# Patient Record
Sex: FEMALE | Race: WHITE | NOT HISPANIC OR LATINO | Employment: FULL TIME | URBAN - METROPOLITAN AREA
[De-identification: names, ages, dates, MRNs, and addresses within clinical notes are randomized per-mention and may not be internally consistent; named-entity substitution may affect disease eponyms.]

---

## 2017-03-09 DIAGNOSIS — Z12.31 ENCOUNTER FOR SCREENING MAMMOGRAM FOR MALIGNANT NEOPLASM OF BREAST: ICD-10-CM

## 2017-04-03 ENCOUNTER — ALLSCRIPTS OFFICE VISIT (OUTPATIENT)
Dept: OTHER | Facility: OTHER | Age: 57
End: 2017-04-03

## 2017-09-07 ENCOUNTER — GENERIC CONVERSION - ENCOUNTER (OUTPATIENT)
Dept: OTHER | Facility: OTHER | Age: 57
End: 2017-09-07

## 2017-10-05 ENCOUNTER — APPOINTMENT (OUTPATIENT)
Dept: LAB | Facility: CLINIC | Age: 57
End: 2017-10-05
Payer: COMMERCIAL

## 2017-10-05 DIAGNOSIS — I10 ESSENTIAL (PRIMARY) HYPERTENSION: ICD-10-CM

## 2017-10-05 LAB
ALBUMIN SERPL BCP-MCNC: 3.7 G/DL (ref 3.5–5)
ALP SERPL-CCNC: 63 U/L (ref 46–116)
ALT SERPL W P-5'-P-CCNC: 34 U/L (ref 12–78)
ANION GAP SERPL CALCULATED.3IONS-SCNC: 7 MMOL/L (ref 4–13)
AST SERPL W P-5'-P-CCNC: 26 U/L (ref 5–45)
BASOPHILS # BLD AUTO: 0.03 THOUSANDS/ΜL (ref 0–0.1)
BASOPHILS NFR BLD AUTO: 0 % (ref 0–1)
BILIRUB SERPL-MCNC: 0.3 MG/DL (ref 0.2–1)
BUN SERPL-MCNC: 13 MG/DL (ref 5–25)
CALCIUM SERPL-MCNC: 9.4 MG/DL (ref 8.3–10.1)
CHLORIDE SERPL-SCNC: 106 MMOL/L (ref 100–108)
CHOLEST SERPL-MCNC: 265 MG/DL (ref 50–200)
CO2 SERPL-SCNC: 27 MMOL/L (ref 21–32)
CREAT SERPL-MCNC: 0.72 MG/DL (ref 0.6–1.3)
EOSINOPHIL # BLD AUTO: 0.21 THOUSAND/ΜL (ref 0–0.61)
EOSINOPHIL NFR BLD AUTO: 3 % (ref 0–6)
ERYTHROCYTE [DISTWIDTH] IN BLOOD BY AUTOMATED COUNT: 12.5 % (ref 11.6–15.1)
GFR SERPL CREATININE-BSD FRML MDRD: 92 ML/MIN/1.73SQ M
GLUCOSE SERPL-MCNC: 103 MG/DL (ref 65–140)
HCT VFR BLD AUTO: 40 % (ref 34.8–46.1)
HDLC SERPL-MCNC: 70 MG/DL (ref 40–60)
HGB BLD-MCNC: 13.2 G/DL (ref 11.5–15.4)
LDLC SERPL CALC-MCNC: 158 MG/DL (ref 0–100)
LYMPHOCYTES # BLD AUTO: 2.93 THOUSANDS/ΜL (ref 0.6–4.47)
LYMPHOCYTES NFR BLD AUTO: 39 % (ref 14–44)
MCH RBC QN AUTO: 32 PG (ref 26.8–34.3)
MCHC RBC AUTO-ENTMCNC: 33 G/DL (ref 31.4–37.4)
MCV RBC AUTO: 97 FL (ref 82–98)
MONOCYTES # BLD AUTO: 0.5 THOUSAND/ΜL (ref 0.17–1.22)
MONOCYTES NFR BLD AUTO: 7 % (ref 4–12)
NEUTROPHILS # BLD AUTO: 3.9 THOUSANDS/ΜL (ref 1.85–7.62)
NEUTS SEG NFR BLD AUTO: 51 % (ref 43–75)
NRBC BLD AUTO-RTO: 0 /100 WBCS
PLATELET # BLD AUTO: 313 THOUSANDS/UL (ref 149–390)
PMV BLD AUTO: 10.8 FL (ref 8.9–12.7)
POTASSIUM SERPL-SCNC: 4 MMOL/L (ref 3.5–5.3)
PROT SERPL-MCNC: 7.2 G/DL (ref 6.4–8.2)
RBC # BLD AUTO: 4.13 MILLION/UL (ref 3.81–5.12)
SODIUM SERPL-SCNC: 140 MMOL/L (ref 136–145)
TRIGL SERPL-MCNC: 184 MG/DL
TSH SERPL DL<=0.05 MIU/L-ACNC: 0.92 UIU/ML (ref 0.36–3.74)
WBC # BLD AUTO: 7.58 THOUSAND/UL (ref 4.31–10.16)

## 2017-10-05 PROCEDURE — 80053 COMPREHEN METABOLIC PANEL: CPT

## 2017-10-05 PROCEDURE — 85025 COMPLETE CBC W/AUTO DIFF WBC: CPT

## 2017-10-05 PROCEDURE — 80061 LIPID PANEL: CPT

## 2017-10-05 PROCEDURE — 84443 ASSAY THYROID STIM HORMONE: CPT

## 2017-10-05 PROCEDURE — 36415 COLL VENOUS BLD VENIPUNCTURE: CPT

## 2017-10-10 ENCOUNTER — GENERIC CONVERSION - ENCOUNTER (OUTPATIENT)
Dept: OTHER | Facility: OTHER | Age: 57
End: 2017-10-10

## 2017-10-10 DIAGNOSIS — Z12.31 ENCOUNTER FOR SCREENING MAMMOGRAM FOR MALIGNANT NEOPLASM OF BREAST: ICD-10-CM

## 2018-01-13 VITALS
DIASTOLIC BLOOD PRESSURE: 78 MMHG | WEIGHT: 175 LBS | SYSTOLIC BLOOD PRESSURE: 128 MMHG | BODY MASS INDEX: 29.16 KG/M2 | HEIGHT: 65 IN

## 2018-01-22 VITALS
HEIGHT: 65 IN | BODY MASS INDEX: 28.55 KG/M2 | SYSTOLIC BLOOD PRESSURE: 112 MMHG | DIASTOLIC BLOOD PRESSURE: 86 MMHG | OXYGEN SATURATION: 97 % | WEIGHT: 171.38 LBS | HEART RATE: 73 BPM

## 2018-03-22 ENCOUNTER — TELEPHONE (OUTPATIENT)
Dept: INTERNAL MEDICINE CLINIC | Facility: CLINIC | Age: 58
End: 2018-03-22

## 2018-03-22 DIAGNOSIS — F41.9 ANXIETY: Primary | ICD-10-CM

## 2018-03-22 RX ORDER — LORAZEPAM 1 MG/1
TABLET ORAL
COMMUNITY
End: 2018-03-22 | Stop reason: SDUPTHER

## 2018-03-22 RX ORDER — LORAZEPAM 1 MG/1
1 TABLET ORAL DAILY
Qty: 90 TABLET | Refills: 3 | Status: SHIPPED | OUTPATIENT
Start: 2018-03-22 | End: 2018-11-08 | Stop reason: SDUPTHER

## 2018-04-16 DIAGNOSIS — I10 ESSENTIAL (PRIMARY) HYPERTENSION: ICD-10-CM

## 2018-04-16 DIAGNOSIS — R79.89 OTHER SPECIFIED ABNORMAL FINDINGS OF BLOOD CHEMISTRY: ICD-10-CM

## 2018-11-05 DIAGNOSIS — T78.40XS ALLERGIC STATE, SEQUELA: Primary | ICD-10-CM

## 2018-11-05 RX ORDER — FLUTICASONE PROPIONATE 50 MCG
SPRAY, SUSPENSION (ML) NASAL
Qty: 1 BOTTLE | Refills: 1 | Status: SHIPPED | OUTPATIENT
Start: 2018-11-05 | End: 2019-03-31 | Stop reason: SDUPTHER

## 2018-11-08 DIAGNOSIS — F41.9 ANXIETY: ICD-10-CM

## 2018-11-08 RX ORDER — LORAZEPAM 1 MG/1
TABLET ORAL
Qty: 90 TABLET | Refills: 1 | Status: SHIPPED | OUTPATIENT
Start: 2018-11-08 | End: 2019-07-10 | Stop reason: SDUPTHER

## 2018-11-12 ENCOUNTER — APPOINTMENT (OUTPATIENT)
Dept: LAB | Facility: CLINIC | Age: 58
End: 2018-11-12
Payer: COMMERCIAL

## 2018-11-12 DIAGNOSIS — I10 ESSENTIAL (PRIMARY) HYPERTENSION: ICD-10-CM

## 2018-11-12 DIAGNOSIS — R79.89 OTHER SPECIFIED ABNORMAL FINDINGS OF BLOOD CHEMISTRY: ICD-10-CM

## 2018-11-12 LAB
ALBUMIN SERPL BCP-MCNC: 3.8 G/DL (ref 3.5–5)
ALP SERPL-CCNC: 54 U/L (ref 46–116)
ALT SERPL W P-5'-P-CCNC: 43 U/L (ref 12–78)
ANION GAP SERPL CALCULATED.3IONS-SCNC: 5 MMOL/L (ref 4–13)
AST SERPL W P-5'-P-CCNC: 30 U/L (ref 5–45)
BILIRUB SERPL-MCNC: 0.23 MG/DL (ref 0.2–1)
BUN SERPL-MCNC: 16 MG/DL (ref 5–25)
CALCIUM SERPL-MCNC: 8.6 MG/DL (ref 8.3–10.1)
CHLORIDE SERPL-SCNC: 104 MMOL/L (ref 100–108)
CHOLEST SERPL-MCNC: 219 MG/DL (ref 50–200)
CO2 SERPL-SCNC: 27 MMOL/L (ref 21–32)
CREAT SERPL-MCNC: 0.72 MG/DL (ref 0.6–1.3)
ERYTHROCYTE [DISTWIDTH] IN BLOOD BY AUTOMATED COUNT: 12.5 % (ref 11.6–15.1)
EST. AVERAGE GLUCOSE BLD GHB EST-MCNC: 114 MG/DL
GFR SERPL CREATININE-BSD FRML MDRD: 91 ML/MIN/1.73SQ M
GLUCOSE SERPL-MCNC: 91 MG/DL (ref 65–140)
HBA1C MFR BLD: 5.6 % (ref 4.2–6.3)
HCT VFR BLD AUTO: 39.1 % (ref 34.8–46.1)
HDLC SERPL-MCNC: 66 MG/DL (ref 40–60)
HGB BLD-MCNC: 12.7 G/DL (ref 11.5–15.4)
LDLC SERPL CALC-MCNC: 126 MG/DL (ref 0–100)
MCH RBC QN AUTO: 32 PG (ref 26.8–34.3)
MCHC RBC AUTO-ENTMCNC: 32.5 G/DL (ref 31.4–37.4)
MCV RBC AUTO: 99 FL (ref 82–98)
NONHDLC SERPL-MCNC: 153 MG/DL
PLATELET # BLD AUTO: 236 THOUSANDS/UL (ref 149–390)
PMV BLD AUTO: 10.3 FL (ref 8.9–12.7)
POTASSIUM SERPL-SCNC: 4.2 MMOL/L (ref 3.5–5.3)
PROT SERPL-MCNC: 7.4 G/DL (ref 6.4–8.2)
RBC # BLD AUTO: 3.97 MILLION/UL (ref 3.81–5.12)
SODIUM SERPL-SCNC: 136 MMOL/L (ref 136–145)
TRIGL SERPL-MCNC: 133 MG/DL
TSH SERPL DL<=0.05 MIU/L-ACNC: 0.94 UIU/ML (ref 0.36–3.74)
WBC # BLD AUTO: 7.39 THOUSAND/UL (ref 4.31–10.16)

## 2018-11-12 PROCEDURE — 80061 LIPID PANEL: CPT

## 2018-11-12 PROCEDURE — 36415 COLL VENOUS BLD VENIPUNCTURE: CPT

## 2018-11-12 PROCEDURE — 85027 COMPLETE CBC AUTOMATED: CPT

## 2018-11-12 PROCEDURE — 80053 COMPREHEN METABOLIC PANEL: CPT

## 2018-11-12 PROCEDURE — 84443 ASSAY THYROID STIM HORMONE: CPT

## 2018-11-12 PROCEDURE — 83036 HEMOGLOBIN GLYCOSYLATED A1C: CPT

## 2018-11-13 ENCOUNTER — TRANSCRIBE ORDERS (OUTPATIENT)
Dept: MAMMOGRAPHY | Facility: CLINIC | Age: 58
End: 2018-11-13

## 2018-11-27 ENCOUNTER — OFFICE VISIT (OUTPATIENT)
Dept: INTERNAL MEDICINE CLINIC | Facility: CLINIC | Age: 58
End: 2018-11-27
Payer: COMMERCIAL

## 2018-11-27 ENCOUNTER — TELEPHONE (OUTPATIENT)
Dept: OBGYN CLINIC | Age: 58
End: 2018-11-27

## 2018-11-27 ENCOUNTER — TELEPHONE (OUTPATIENT)
Dept: INTERNAL MEDICINE CLINIC | Facility: CLINIC | Age: 58
End: 2018-11-27

## 2018-11-27 ENCOUNTER — HOSPITAL ENCOUNTER (OUTPATIENT)
Dept: MAMMOGRAPHY | Facility: CLINIC | Age: 58
Discharge: HOME | End: 2018-11-27
Payer: COMMERCIAL

## 2018-11-27 VITALS — HEIGHT: 66 IN | BODY MASS INDEX: 25.71 KG/M2 | WEIGHT: 160 LBS

## 2018-11-27 DIAGNOSIS — L98.7 EXCESSIVE SKIN AND SUBCUTANEOUS TISSUE: ICD-10-CM

## 2018-11-27 DIAGNOSIS — M25.512 LEFT SHOULDER PAIN, UNSPECIFIED CHRONICITY: ICD-10-CM

## 2018-11-27 DIAGNOSIS — M54.2 NECK PAIN: ICD-10-CM

## 2018-11-27 DIAGNOSIS — I10 ESSENTIAL HYPERTENSION: ICD-10-CM

## 2018-11-27 DIAGNOSIS — Z12.31 BREAST CANCER SCREENING BY MAMMOGRAM: Primary | ICD-10-CM

## 2018-11-27 DIAGNOSIS — D75.89 MACROCYTOSIS: ICD-10-CM

## 2018-11-27 DIAGNOSIS — Z12.11 SCREENING FOR COLON CANCER: Primary | ICD-10-CM

## 2018-11-27 DIAGNOSIS — E78.5 HYPERLIPIDEMIA, UNSPECIFIED HYPERLIPIDEMIA TYPE: ICD-10-CM

## 2018-11-27 DIAGNOSIS — Z78.0 POST-MENOPAUSAL: ICD-10-CM

## 2018-11-27 DIAGNOSIS — Z00.00 PHYSICAL EXAM: ICD-10-CM

## 2018-11-27 DIAGNOSIS — M25.50 ARTHRALGIA, UNSPECIFIED JOINT: ICD-10-CM

## 2018-11-27 DIAGNOSIS — R73.01 IMPAIRED FASTING BLOOD SUGAR: ICD-10-CM

## 2018-11-27 DIAGNOSIS — Z23 ENCOUNTER FOR IMMUNIZATION: Primary | ICD-10-CM

## 2018-11-27 DIAGNOSIS — Z12.31 BREAST CANCER SCREENING BY MAMMOGRAM: ICD-10-CM

## 2018-11-27 PROCEDURE — 90471 IMMUNIZATION ADMIN: CPT

## 2018-11-27 PROCEDURE — 77063 BREAST TOMOSYNTHESIS BI: CPT

## 2018-11-27 PROCEDURE — 99496 TRANSJ CARE MGMT HIGH F2F 7D: CPT | Performed by: NURSE PRACTITIONER

## 2018-11-27 PROCEDURE — 90686 IIV4 VACC NO PRSV 0.5 ML IM: CPT

## 2018-11-27 PROCEDURE — 77067 SCR MAMMO BI INCL CAD: CPT

## 2018-11-27 NOTE — TELEPHONE ENCOUNTER
Patient is calling she would like an order for her MAMMO for 2018  She would like to get it done today

## 2018-11-27 NOTE — PATIENT INSTRUCTIONS
58-year-old female who or peel peers in excellent condition  She is having mammogram today, she has never had bone density so this is been ordered  She has never had colonoscopy she would like to inquire about colo card she will let us know we will order if insurance will cover this  Continue diet exercise and weight loss efforts  Mild macrocytosis she does admit that she has 1-2 vodkas per night I recommend no more than 1 oz per evening    Recheck in 1 year  Situational anxiety she takes 1/2 lorazepam as needed  Neck pain/shoulder pain likely musculoskeletal she does get frequent massage his would also recommend physical therapy referral made  Hyperlipidemia markedly improvement with weight loss and dietary changes  Follow up 1 year sooner if needed  Undesirable tissue under arms refer to Plastic surgery  Foot pain she states that she was told she has psoriatic arthritis I do not have any documentation to support this she was not refer to her rheumatologist we will try to obtain her podiatrist last note

## 2018-11-27 NOTE — TELEPHONE ENCOUNTER
PT  SAW  CHRIS  TODAY   NOW  SHE  WANTS  HER  INFO  SENT  TO  [facility]  SHE  WILL  CALL THEM  ABOUT  HER  INSURANCE   THANKS

## 2018-11-27 NOTE — PROGRESS NOTES
Assessment/Plan:    Patient Instructions   77-year-old female who or peel peers in excellent condition  She is having mammogram today, she has never had bone density so this is been ordered  She has never had colonoscopy she would like to inquire about colo card she will let us know we will order if insurance will cover this  Continue diet exercise and weight loss efforts  Mild macrocytosis she does admit that she has 1-2 vodkas per night I recommend no more than 1 oz per evening  Recheck in 1 year  Situational anxiety she takes 1/2 lorazepam as needed  Neck pain/shoulder pain likely musculoskeletal she does get frequent massage his would also recommend physical therapy referral made  Hyperlipidemia markedly improvement with weight loss and dietary changes  Follow up 1 year sooner if needed  Undesirable tissue under arms refer to Plastic surgery  Foot pain she states that she was told she has psoriatic arthritis I do not have any documentation to support this she was not refer to her rheumatologist we will try to obtain her podiatrist last note         Diagnoses and all orders for this visit:    Encounter for immunization  -     SYRINGE/SINGLE-DOSE VIAL: influenza vaccine, [id number], quadrivalent, 0 5 mL, preservative-free, for patients 3+ yr (FLUZONE)    Physical exam    Neck pain  -     Ambulatory referral to Physical Therapy; Future    Left shoulder pain, unspecified chronicity  -     Ambulatory referral to Physical Therapy; Future    Post-menopausal  -     DXA bone density spine hip and pelvis; Future    Macrocytosis  -     CBC and differential; Future    Arthralgia, unspecified joint    Essential hypertension  -     Comprehensive metabolic panel; Future    Impaired fasting blood sugar  -     Hemoglobin A1C; Future    Excessive skin and subcutaneous tissue  -     Ambulatory referral to Plastic Surgery; Future    Hyperlipidemia, unspecified hyperlipidemia type  -     Lipid panel;  Future  -     TSH, 3rd generation with Free T4 reflex;  Future         Subjective:      Patient ID: Mati aPce is a 59 y o  female    Generally feeling well  She would like to inquire about colo card she has never had colonoscopy  She is getting mammogram today  Up-to-date with gynecology  Active  She had plastic surgery about 3 years ago for undesirable tissue under the arm was unsuccessful they actually gave her her money back but it still bothers her  She has been having neck pain and shoulder pain upper back pain for about 6 months worse with movement she has been getting massages it is minimally effective          Current Outpatient Prescriptions:     fluticasone (FLONASE) 50 mcg/act nasal spray, USE 2 SPRAYS IN EACH NOSTRIL ONCE DAILY, Disp: 1 Bottle, Rfl: 1    LORazepam (ATIVAN) 1 mg tablet, TAKE 1 TABLET BY MOUTH EVERY DAY, Disp: 90 tablet, Rfl: 1    Recent Results (from the past 1008 hour(s))   CBC    Collection Time: 11/12/18  5:03 PM   Result Value Ref Range    WBC 7 39 4 31 - 10 16 Thousand/uL    RBC 3 97 3 81 - 5 12 Million/uL    Hemoglobin 12 7 11 5 - 15 4 g/dL    Hematocrit 39 1 34 8 - 46 1 %    MCV 99 (H) 82 - 98 fL    MCH 32 0 26 8 - 34 3 pg    MCHC 32 5 31 4 - 37 4 g/dL    RDW 12 5 11 6 - 15 1 %    Platelets 281 197 - 166 Thousands/uL    MPV 10 3 8 9 - 12 7 fL   Comprehensive metabolic panel    Collection Time: 11/12/18  5:03 PM   Result Value Ref Range    Sodium 136 136 - 145 mmol/L    Potassium 4 2 3 5 - 5 3 mmol/L    Chloride 104 100 - 108 mmol/L    CO2 27 21 - 32 mmol/L    ANION GAP 5 4 - 13 mmol/L    BUN 16 5 - 25 mg/dL    Creatinine 0 72 0 60 - 1 30 mg/dL    Glucose 91 65 - 140 mg/dL    Calcium 8 6 8 3 - 10 1 mg/dL    AST 30 5 - 45 U/L    ALT 43 12 - 78 U/L    Alkaline Phosphatase 54 46 - 116 U/L    Total Protein 7 4 6 4 - 8 2 g/dL    Albumin 3 8 3 5 - 5 0 g/dL    Total Bilirubin 0 23 0 20 - 1 00 mg/dL    eGFR 91 ml/min/1 73sq m   Hemoglobin A1c    Collection Time: 11/12/18  5:03 PM   Result Value Ref Range Hemoglobin A1C 5 6 4 2 - 6 3 %     mg/dl   Lipid panel    Collection Time: 11/12/18  5:03 PM   Result Value Ref Range    Cholesterol 219 (H) 50 - 200 mg/dL    Triglycerides 133 <=150 mg/dL    HDL, Direct 66 (H) 40 - 60 mg/dL    LDL Calculated 126 (H) 0 - 100 mg/dL    Non-HDL-Chol (CHOL-HDL) 153 mg/dl   TSH, 3rd generation    Collection Time: 11/12/18  5:03 PM   Result Value Ref Range    TSH 3RD GENERATON 0 939 0 358 - 3 740 uIU/mL       The following portions of the patient's history were reviewed and updated as appropriate: allergies, current medications, past family history, past medical history, past social history, past surgical history and problem list      Review of Systems   Constitutional: Negative for appetite change, chills, diaphoresis, fatigue, fever and unexpected weight change  HENT: Negative for postnasal drip and sneezing  Eyes: Negative for visual disturbance  Respiratory: Negative for chest tightness and shortness of breath  Cardiovascular: Negative for chest pain, palpitations and leg swelling  Gastrointestinal: Negative for abdominal pain and blood in stool  Endocrine: Negative for cold intolerance, heat intolerance, polydipsia, polyphagia and polyuria  Genitourinary: Negative for difficulty urinating, dysuria, frequency and urgency  Musculoskeletal: Positive for back pain  Negative for arthralgias and myalgias  Skin: Negative for rash and wound  Neurological: Negative for dizziness, weakness, light-headedness and headaches  Hematological: Negative for adenopathy  Psychiatric/Behavioral: Negative for confusion, dysphoric mood and sleep disturbance  The patient is not nervous/anxious  Objective: There were no vitals taken for this visit  Physical Exam   Constitutional: She is oriented to person, place, and time  She appears well-developed  No distress  HENT:   Head: Normocephalic and atraumatic     Nose: Nose normal    Mouth/Throat: Oropharynx is clear and moist    Eyes: Pupils are equal, round, and reactive to light  Conjunctivae and EOM are normal    Neck: Normal range of motion  Neck supple  No JVD present  No tracheal deviation present  No thyromegaly present  Cardiovascular: Normal rate, regular rhythm, normal heart sounds and intact distal pulses  Exam reveals no gallop and no friction rub  No murmur heard  Pulmonary/Chest: Effort normal and breath sounds normal  No respiratory distress  She has no wheezes  She has no rales  Abdominal: Soft  Bowel sounds are normal  She exhibits no distension  There is no tenderness  Musculoskeletal: Normal range of motion  She exhibits no edema  Lymphadenopathy:     She has no cervical adenopathy  Neurological: She is alert and oriented to person, place, and time  No cranial nerve deficit  Skin: Skin is warm and dry  No rash noted  She is not diaphoretic  Psychiatric: She has a normal mood and affect   Her behavior is normal  Judgment and thought content normal

## 2018-12-14 ENCOUNTER — TELEPHONE (OUTPATIENT)
Dept: INTERNAL MEDICINE CLINIC | Facility: CLINIC | Age: 58
End: 2018-12-14

## 2019-03-15 ENCOUNTER — OFFICE VISIT (OUTPATIENT)
Dept: INTERNAL MEDICINE CLINIC | Facility: CLINIC | Age: 59
End: 2019-03-15
Payer: COMMERCIAL

## 2019-03-15 VITALS
SYSTOLIC BLOOD PRESSURE: 118 MMHG | BODY MASS INDEX: 26.37 KG/M2 | WEIGHT: 163.4 LBS | HEART RATE: 82 BPM | OXYGEN SATURATION: 98 % | DIASTOLIC BLOOD PRESSURE: 78 MMHG | TEMPERATURE: 97.8 F

## 2019-03-15 DIAGNOSIS — R05.9 COUGH: Primary | ICD-10-CM

## 2019-03-15 PROCEDURE — 99213 OFFICE O/P EST LOW 20 MIN: CPT | Performed by: NURSE PRACTITIONER

## 2019-03-15 PROCEDURE — 1036F TOBACCO NON-USER: CPT | Performed by: NURSE PRACTITIONER

## 2019-03-15 RX ORDER — BENZONATATE 200 MG/1
200 CAPSULE ORAL 3 TIMES DAILY PRN
Qty: 20 CAPSULE | Refills: 0 | Status: SHIPPED | OUTPATIENT
Start: 2019-03-15 | End: 2020-01-02 | Stop reason: ALTCHOICE

## 2019-03-15 NOTE — PROGRESS NOTES
Assessment/Plan:    Patient Instructions   Cough likely allergy recommend claritin d 12 and take in the am, continue sinus rinses followed by flonase  Start tesselon perles 1 every 8 hours as needed  Diagnoses and all orders for this visit:    Cough  -     benzonatate (TESSALON) 200 MG capsule; Take 1 capsule (200 mg total) by mouth 3 (three) times a day as needed for cough         Subjective:      Patient ID: Aysha Olivia is a 59 y o  female    Patient not feeling well for approximately 2-3 days  She has headaches, postnasal drip, cough and sore throat ear pressure  No fevers chills  No shortness of breath or wheeze  She is using Flonase daily  Current Outpatient Medications:     fluticasone (FLONASE) 50 mcg/act nasal spray, USE 2 SPRAYS IN EACH NOSTRIL ONCE DAILY, Disp: 1 Bottle, Rfl: 1    LORazepam (ATIVAN) 1 mg tablet, TAKE 1 TABLET BY MOUTH EVERY DAY, Disp: 90 tablet, Rfl: 1    benzonatate (TESSALON) 200 MG capsule, Take 1 capsule (200 mg total) by mouth 3 (three) times a day as needed for cough, Disp: 20 capsule, Rfl: 0    No results found for this or any previous visit (from the past 1008 hour(s))  The following portions of the patient's history were reviewed and updated as appropriate: allergies, current medications, past family history, past medical history, past social history, past surgical history and problem list      Review of Systems   Constitutional: Positive for fatigue  Negative for appetite change, chills, diaphoresis, fever and unexpected weight change  HENT: Positive for postnasal drip  Negative for sneezing  Eyes: Negative for visual disturbance  Respiratory: Positive for cough  Negative for chest tightness and shortness of breath  Cardiovascular: Negative for chest pain, palpitations and leg swelling  Gastrointestinal: Negative for abdominal pain and blood in stool     Endocrine: Negative for cold intolerance, heat intolerance, polydipsia, polyphagia and polyuria  Genitourinary: Negative for difficulty urinating, dysuria, frequency and urgency  Musculoskeletal: Negative for arthralgias and myalgias  Skin: Negative for rash and wound  Neurological: Negative for dizziness, weakness, light-headedness and headaches  Hematological: Negative for adenopathy  Psychiatric/Behavioral: Negative for confusion, dysphoric mood and sleep disturbance  The patient is not nervous/anxious  Objective:      /78 (BP Location: Left arm, Patient Position: Sitting)   Pulse 82   Temp 97 8 °F (36 6 °C)   Wt 74 1 kg (163 lb 6 4 oz)   SpO2 98%   BMI 26 37 kg/m²        Physical Exam   Constitutional: She is oriented to person, place, and time  She appears well-developed  No distress  HENT:   Head: Normocephalic and atraumatic  Nose: Nose normal    Mouth/Throat: Oropharynx is clear and moist    Eyes: Pupils are equal, round, and reactive to light  Conjunctivae and EOM are normal    Neck: Normal range of motion  Neck supple  No JVD present  No tracheal deviation present  No thyromegaly present  Cardiovascular: Normal rate, regular rhythm, normal heart sounds and intact distal pulses  Exam reveals no gallop and no friction rub  No murmur heard  Pulmonary/Chest: Effort normal and breath sounds normal  No respiratory distress  She has no wheezes  She has no rales  Abdominal: Soft  Bowel sounds are normal  She exhibits no distension  There is no tenderness  Musculoskeletal: Normal range of motion  She exhibits no edema  Lymphadenopathy:     She has no cervical adenopathy  Neurological: She is alert and oriented to person, place, and time  No cranial nerve deficit  Skin: Skin is warm and dry  No rash noted  She is not diaphoretic  Psychiatric: She has a normal mood and affect   Her behavior is normal  Judgment and thought content normal

## 2019-03-15 NOTE — PATIENT INSTRUCTIONS
Cough likely allergy recommend claritin d 12 and take in the am, continue sinus rinses followed by flonase  Start tesselon perles 1 every 8 hours as needed

## 2019-03-31 DIAGNOSIS — T78.40XS ALLERGIC STATE, SEQUELA: ICD-10-CM

## 2019-04-01 RX ORDER — FLUTICASONE PROPIONATE 50 MCG
SPRAY, SUSPENSION (ML) NASAL
Qty: 1 BOTTLE | Refills: 1 | Status: SHIPPED | OUTPATIENT
Start: 2019-04-01 | End: 2019-10-21 | Stop reason: SDUPTHER

## 2019-07-10 DIAGNOSIS — F41.9 ANXIETY: ICD-10-CM

## 2019-07-11 RX ORDER — LORAZEPAM 1 MG/1
TABLET ORAL
Qty: 90 TABLET | Refills: 2 | Status: SHIPPED | OUTPATIENT
Start: 2019-07-11 | End: 2020-01-02 | Stop reason: SDUPTHER

## 2019-10-21 ENCOUNTER — OFFICE VISIT (OUTPATIENT)
Dept: INTERNAL MEDICINE CLINIC | Facility: CLINIC | Age: 59
End: 2019-10-21
Payer: COMMERCIAL

## 2019-10-21 VITALS
HEIGHT: 66 IN | DIASTOLIC BLOOD PRESSURE: 80 MMHG | HEART RATE: 78 BPM | TEMPERATURE: 99.1 F | RESPIRATION RATE: 14 BRPM | SYSTOLIC BLOOD PRESSURE: 124 MMHG | BODY MASS INDEX: 27.87 KG/M2 | WEIGHT: 173.4 LBS

## 2019-10-21 DIAGNOSIS — Z12.39 SCREENING FOR BREAST CANCER: Primary | ICD-10-CM

## 2019-10-21 DIAGNOSIS — M54.2 CERVICALGIA: ICD-10-CM

## 2019-10-21 DIAGNOSIS — L84 CALLUS: ICD-10-CM

## 2019-10-21 DIAGNOSIS — F41.9 ANXIETY: ICD-10-CM

## 2019-10-21 DIAGNOSIS — T78.40XS ALLERGIC STATE, SEQUELA: ICD-10-CM

## 2019-10-21 PROCEDURE — 99213 OFFICE O/P EST LOW 20 MIN: CPT | Performed by: NURSE PRACTITIONER

## 2019-10-21 PROCEDURE — 3008F BODY MASS INDEX DOCD: CPT | Performed by: NURSE PRACTITIONER

## 2019-10-21 RX ORDER — FLUTICASONE PROPIONATE 50 MCG
2 SPRAY, SUSPENSION (ML) NASAL AS NEEDED
Qty: 1 BOTTLE | Refills: 6 | Status: SHIPPED | OUTPATIENT
Start: 2019-10-21 | End: 2020-01-02 | Stop reason: SDUPTHER

## 2019-10-21 NOTE — PATIENT INSTRUCTIONS
Allergic rhinitis/postnasal drip /eustachian tube dysfunction discussed pathophysiology with patient recommend saline irrigation followed by Flonase with Claritin if symptoms are not improving contact us     cervicalgia/ left scapular pain refer to physical therapy     callus to feet refer to Podiatry

## 2019-10-21 NOTE — PROGRESS NOTES
Assessment/Plan:    Patient Instructions    Allergic rhinitis/postnasal drip /eustachian tube dysfunction discussed pathophysiology with patient recommend saline irrigation followed by Flonase with Claritin if symptoms are not improving contact us     cervicalgia/ left scapular pain refer to physical therapy     callus to feet refer to Podiatry         Diagnoses and all orders for this visit:    Screening for breast cancer  -     Mammo screening bilateral w 3d & cad; Future    Allergic state, sequela  -     fluticasone (FLONASE) 50 mcg/act nasal spray; 2 sprays into each nostril as needed for rhinitis    Anxiety    Cervicalgia  -     Ambulatory referral to Physical Therapy; Future    Callus  -     Ambulatory referral to Podiatry; Future         Subjective:      Patient ID: Lou Landa is a 62 y o  female     Patient has not felt well for about 3 days she has had chills aches ear pain sore throat heaviness in the chest sinus pressure  She also has issues with her feet she has significant calluses not sure what she should do about it  She has been having also neck pain radiating into the left shoulder blade she never did physical therapy        Current Outpatient Medications:     benzonatate (TESSALON) 200 MG capsule, Take 1 capsule (200 mg total) by mouth 3 (three) times a day as needed for cough, Disp: 20 capsule, Rfl: 0    fluticasone (FLONASE) 50 mcg/act nasal spray, 2 sprays into each nostril as needed for rhinitis, Disp: 1 Bottle, Rfl: 6    LORazepam (ATIVAN) 1 mg tablet, TAKE 1 TABLET BY MOUTH EVERY DAY (Patient taking differently: Take 1 mg by mouth daily ), Disp: 90 tablet, Rfl: 2    No results found for this or any previous visit (from the past 1008 hour(s))      The following portions of the patient's history were reviewed and updated as appropriate: allergies, current medications, past family history, past medical history, past social history, past surgical history and problem list      Review of Systems   Constitutional: Negative for appetite change, chills, diaphoresis, fatigue, fever and unexpected weight change  HENT: Positive for postnasal drip, sinus pain, sneezing, sore throat and trouble swallowing  Eyes: Negative for visual disturbance  Respiratory: Positive for cough  Negative for chest tightness and shortness of breath  Cardiovascular: Negative for chest pain, palpitations and leg swelling  Gastrointestinal: Negative for abdominal pain and blood in stool  Endocrine: Negative for cold intolerance, heat intolerance, polydipsia, polyphagia and polyuria  Genitourinary: Negative for difficulty urinating, dysuria, frequency and urgency  Musculoskeletal: Negative for arthralgias and myalgias  Skin: Negative for rash and wound  Neurological: Negative for dizziness, weakness, light-headedness and headaches  Hematological: Negative for adenopathy  Psychiatric/Behavioral: Negative for confusion, dysphoric mood and sleep disturbance  The patient is not nervous/anxious  Objective:      /80 (BP Location: Left arm, Patient Position: Sitting, Cuff Size: Standard)   Pulse 78   Temp 99 1 °F (37 3 °C) (Oral) Comment: tylenol  Resp 14   Ht 5' 6" (1 676 m)   Wt 78 7 kg (173 lb 6 4 oz)   BMI 27 99 kg/m²        Physical Exam   Constitutional: She is oriented to person, place, and time  She appears well-developed  No distress  HENT:   Head: Normocephalic and atraumatic  Nose: Nose normal    Mouth/Throat: Oropharynx is clear and moist     Nares are erythematous edematous TMs bulging   Eyes: Pupils are equal, round, and reactive to light  Conjunctivae and EOM are normal    Neck: Normal range of motion  Neck supple  No JVD present  No tracheal deviation present  No thyromegaly present  Cardiovascular: Normal rate, regular rhythm, normal heart sounds and intact distal pulses  Exam reveals no gallop and no friction rub  No murmur heard    Pulmonary/Chest: Effort normal and breath sounds normal  No respiratory distress  She has no wheezes  She has no rales  Abdominal: Soft  Bowel sounds are normal  She exhibits no distension  There is no tenderness  Musculoskeletal: Normal range of motion  She exhibits no edema  Lymphadenopathy:     She has no cervical adenopathy  Neurological: She is alert and oriented to person, place, and time  No cranial nerve deficit  Skin: Skin is warm and dry  No rash noted  She is not diaphoretic  Significant callus to bilateral feet   Psychiatric: She has a normal mood and affect   Her behavior is normal  Judgment and thought content normal

## 2020-01-02 ENCOUNTER — OFFICE VISIT (OUTPATIENT)
Dept: INTERNAL MEDICINE CLINIC | Facility: CLINIC | Age: 60
End: 2020-01-02
Payer: COMMERCIAL

## 2020-01-02 VITALS
HEART RATE: 90 BPM | BODY MASS INDEX: 27.83 KG/M2 | HEIGHT: 66 IN | TEMPERATURE: 99.5 F | RESPIRATION RATE: 16 BRPM | WEIGHT: 173.2 LBS | SYSTOLIC BLOOD PRESSURE: 132 MMHG | DIASTOLIC BLOOD PRESSURE: 86 MMHG

## 2020-01-02 DIAGNOSIS — J11.1 INFLUENZA: Primary | ICD-10-CM

## 2020-01-02 DIAGNOSIS — T78.40XS ALLERGIC STATE, SEQUELA: ICD-10-CM

## 2020-01-02 DIAGNOSIS — F41.9 ANXIETY: ICD-10-CM

## 2020-01-02 PROCEDURE — 1036F TOBACCO NON-USER: CPT | Performed by: NURSE PRACTITIONER

## 2020-01-02 PROCEDURE — 99213 OFFICE O/P EST LOW 20 MIN: CPT | Performed by: NURSE PRACTITIONER

## 2020-01-02 PROCEDURE — 3008F BODY MASS INDEX DOCD: CPT | Performed by: NURSE PRACTITIONER

## 2020-01-02 RX ORDER — FLUTICASONE PROPIONATE 50 MCG
2 SPRAY, SUSPENSION (ML) NASAL AS NEEDED
Qty: 1 BOTTLE | Refills: 6 | Status: SHIPPED | OUTPATIENT
Start: 2020-01-02 | End: 2020-10-22 | Stop reason: SDUPTHER

## 2020-01-02 RX ORDER — LORAZEPAM 1 MG/1
1 TABLET ORAL DAILY
Qty: 90 TABLET | Refills: 2 | Status: SHIPPED | OUTPATIENT
Start: 2020-01-02 | End: 2020-09-15

## 2020-01-02 NOTE — PROGRESS NOTES
Assessment/Plan:    Patient Instructions     Probable influenza a, discussed pathophysiology with patient  Recommend rest hydration she can take over-the-counter  Tylenol cold and sinus medication  She should not return back to work until she is 24 hours free of fever without medication  Discussed Tamiflu  And its indications risks for versus benefits of medication  We will hold off on this for now  Diagnoses and all orders for this visit:    Influenza    Allergic state, sequela  -     fluticasone (FLONASE) 50 mcg/act nasal spray; 2 sprays into each nostril as needed for rhinitis    Anxiety  -     LORazepam (ATIVAN) 1 mg tablet; Take 1 tablet (1 mg total) by mouth daily    Other orders  -     Loratadine (CLARITIN PO); Take by mouth         Subjective:      Patient ID: Leah Valente is a 62 y o  female     Patient has not felt well for the last several days of for she started with cysts upset stomach and diarrhea then the next day or 2 she had acute onset of body aches and pains headaches cough skin sensitivity profoundly fatigued  She is not sure if she has fever because she has been taking Tylenol or Advil  No shortness of breath no wheeze        Current Outpatient Medications:     fluticasone (FLONASE) 50 mcg/act nasal spray, 2 sprays into each nostril as needed for rhinitis, Disp: 1 Bottle, Rfl: 6    Loratadine (CLARITIN PO), Take by mouth, Disp: , Rfl:     LORazepam (ATIVAN) 1 mg tablet, Take 1 tablet (1 mg total) by mouth daily, Disp: 90 tablet, Rfl: 2    No results found for this or any previous visit (from the past 1008 hour(s))  The following portions of the patient's history were reviewed and updated as appropriate: allergies, current medications, past family history, past medical history, past social history, past surgical history and problem list      Review of Systems   Constitutional: Negative for appetite change, chills, diaphoresis, fatigue, fever and unexpected weight change  HENT: Negative for postnasal drip and sneezing  Eyes: Negative for visual disturbance  Respiratory: Negative for chest tightness and shortness of breath  Cardiovascular: Negative for chest pain, palpitations and leg swelling  Gastrointestinal: Negative for abdominal pain and blood in stool  Endocrine: Negative for cold intolerance, heat intolerance, polydipsia, polyphagia and polyuria  Genitourinary: Negative for difficulty urinating, dysuria, frequency and urgency  Musculoskeletal: Negative for arthralgias and myalgias  Skin: Negative for rash and wound  Neurological: Negative for dizziness, weakness, light-headedness and headaches  Hematological: Negative for adenopathy  Psychiatric/Behavioral: Negative for confusion, dysphoric mood and sleep disturbance  The patient is not nervous/anxious  Objective:      /86 (BP Location: Left arm, Patient Position: Sitting, Cuff Size: Standard)   Pulse 90   Temp 99 5 °F (37 5 °C) (Tympanic)   Resp 16   Ht 5' 6" (1 676 m)   Wt 78 6 kg (173 lb 3 2 oz)   BMI 27 96 kg/m²        Physical Exam   Constitutional: She is oriented to person, place, and time  She appears well-developed  No distress  HENT:   Head: Normocephalic and atraumatic  Nose: Nose normal    Mouth/Throat: Oropharynx is clear and moist    Eyes: Pupils are equal, round, and reactive to light  Conjunctivae and EOM are normal    Neck: Normal range of motion  Neck supple  No JVD present  No tracheal deviation present  No thyromegaly present  Cardiovascular: Normal rate, regular rhythm, normal heart sounds and intact distal pulses  Exam reveals no gallop and no friction rub  No murmur heard  Pulmonary/Chest: Effort normal and breath sounds normal  No respiratory distress  She has no wheezes  She has no rales  Abdominal: Soft  Bowel sounds are normal  She exhibits no distension  There is no tenderness  Musculoskeletal: Normal range of motion  She exhibits no edema  Lymphadenopathy:     She has no cervical adenopathy  Neurological: She is alert and oriented to person, place, and time  No cranial nerve deficit  Skin: Skin is warm and dry  No rash noted  She is not diaphoretic  Psychiatric: She has a normal mood and affect  Her behavior is normal  Judgment and thought content normal    BMI Counseling: Body mass index is 27 96 kg/m²  The BMI is above normal  Nutrition recommendations include decreasing portion sizes, consuming healthier snacks and limiting drinks that contain sugar  Exercise recommendations include exercising 3-5 times per week  No pharmacotherapy was ordered

## 2020-01-02 NOTE — PATIENT INSTRUCTIONS
Probable influenza a, discussed pathophysiology with patient  Recommend rest hydration she can take over-the-counter  Tylenol cold and sinus medication  She should not return back to work until she is 24 hours free of fever without medication  Discussed Tamiflu  And its indications risks for versus benefits of medication  We will hold off on this for now

## 2020-01-02 NOTE — LETTER
January 2, 2020     Patient: Mateo Carrington   YOB: 1960   Date of Visit: 1/2/2020       To Whom it May Concern:    Mateo Carrington is under my professional care  She was seen in my office on 1/2/2020  She may return to work on 1/5/2020  If you have any questions or concerns, please don't hesitate to call           Sincerely,          OMAR Arrington        CC: No Recipients

## 2020-01-02 NOTE — LETTER
January 2, 2020     Patient: Rachel Granger   YOB: 1960   Date of Visit: 1/2/2020       To Whom it May Concern:    Rachel Granger is under my professional care  She was seen in my office on 1/2/2020  She may return to work on 01/06/2020  If you have any questions or concerns, please don't hesitate to call           Sincerely,          OMAR Gerber

## 2020-07-10 ENCOUNTER — HOSPITAL ENCOUNTER (OUTPATIENT)
Dept: MAMMOGRAPHY | Facility: CLINIC | Age: 60
Discharge: HOME | End: 2020-07-10
Payer: COMMERCIAL

## 2020-07-10 VITALS — HEIGHT: 66 IN | BODY MASS INDEX: 27.8 KG/M2 | WEIGHT: 173 LBS

## 2020-07-10 DIAGNOSIS — Z12.39 SCREENING FOR BREAST CANCER: ICD-10-CM

## 2020-07-10 PROCEDURE — 77063 BREAST TOMOSYNTHESIS BI: CPT

## 2020-07-10 PROCEDURE — 77067 SCR MAMMO BI INCL CAD: CPT

## 2020-07-17 ENCOUNTER — TELEPHONE (OUTPATIENT)
Dept: INTERNAL MEDICINE CLINIC | Facility: CLINIC | Age: 60
End: 2020-07-17

## 2020-07-20 ENCOUNTER — OFFICE VISIT (OUTPATIENT)
Dept: INTERNAL MEDICINE CLINIC | Facility: CLINIC | Age: 60
End: 2020-07-20
Payer: COMMERCIAL

## 2020-07-20 VITALS
HEIGHT: 66 IN | TEMPERATURE: 98.6 F | HEART RATE: 78 BPM | SYSTOLIC BLOOD PRESSURE: 128 MMHG | BODY MASS INDEX: 26.71 KG/M2 | WEIGHT: 166.2 LBS | RESPIRATION RATE: 14 BRPM | DIASTOLIC BLOOD PRESSURE: 80 MMHG

## 2020-07-20 DIAGNOSIS — Z78.0 POSTMENOPAUSAL: ICD-10-CM

## 2020-07-20 DIAGNOSIS — R73.01 IMPAIRED FASTING BLOOD SUGAR: ICD-10-CM

## 2020-07-20 DIAGNOSIS — E55.9 VITAMIN D DEFICIENCY: ICD-10-CM

## 2020-07-20 DIAGNOSIS — Z13.820 SCREENING FOR OSTEOPOROSIS: Primary | ICD-10-CM

## 2020-07-20 DIAGNOSIS — L30.1 DYSHIDROSIS: ICD-10-CM

## 2020-07-20 DIAGNOSIS — E78.5 HYPERLIPIDEMIA, UNSPECIFIED HYPERLIPIDEMIA TYPE: ICD-10-CM

## 2020-07-20 DIAGNOSIS — Z12.11 SCREENING FOR COLON CANCER: ICD-10-CM

## 2020-07-20 DIAGNOSIS — Z13.6 SCREENING FOR CARDIOVASCULAR CONDITION: ICD-10-CM

## 2020-07-20 DIAGNOSIS — I10 ESSENTIAL HYPERTENSION: ICD-10-CM

## 2020-07-20 DIAGNOSIS — Z00.00 PHYSICAL EXAM: ICD-10-CM

## 2020-07-20 PROCEDURE — 99396 PREV VISIT EST AGE 40-64: CPT | Performed by: NURSE PRACTITIONER

## 2020-07-20 PROCEDURE — 3079F DIAST BP 80-89 MM HG: CPT | Performed by: NURSE PRACTITIONER

## 2020-07-20 PROCEDURE — 3074F SYST BP LT 130 MM HG: CPT | Performed by: NURSE PRACTITIONER

## 2020-07-20 PROCEDURE — 3008F BODY MASS INDEX DOCD: CPT | Performed by: NURSE PRACTITIONER

## 2020-07-20 NOTE — PATIENT INSTRUCTIONS
76-year-old female presents for annual visit  Generally feeling well  She is attempting weight loss  I offered medical weight loss which she declined  We will check labs if there is any evidence of prediabetes consider [facility] maintenance mammogram completed, has not done colonoscopy will agree to Cologuard, bone density also ordered  Anxiety stable with p r n  Hyperhidrosis check TSH start dries all risk benefit side effects discussed with patient    Follow results of labs by phone

## 2020-07-20 NOTE — PROGRESS NOTES
Assessment/Plan:    Patient Instructions    24-year-old female presents for annual visit  Generally feeling well  She is attempting weight loss  I offered medical weight loss which she declined  We will check labs if there is any evidence of prediabetes consider [facility] maintenance mammogram completed, has not done colonoscopy will agree to Cologuard, bone density also ordered  Anxiety stable with p r n  Hyperhidrosis check TSH start dries all risk benefit side effects discussed with patient  Follow results of labs by phone         Diagnoses and all orders for this visit:    Screening for osteoporosis    Physical exam    Postmenopausal  -     DXA bone density spine hip and pelvis; Future    Screening for colon cancer  -     Cologuard; Future    Vitamin D deficiency  -     Vitamin D 25 hydroxy; Future    Essential hypertension  -     CBC and differential; Future  -     Comprehensive metabolic panel; Future    Impaired fasting blood sugar  -     Hemoglobin A1C; Future    Screening for cardiovascular condition    Hyperlipidemia, unspecified hyperlipidemia type  -     Lipid panel; Future  -     TSH, 3rd generation with Free T4 reflex; Future    Dyshidrosis  -     aluminum chloride (DRYSOL) 20 % external solution; Apply topically daily at bedtime    Other orders  -     Cancel: Ambulatory referral to Gastroenterology; Future         Subjective:      Patient ID: Rico Davis is a 62 y o  female     Patient here for annual visit  States she is generally feeling well has no new physical concerns  Up-to-date with mammogram needs  Has not had bone density, trying to stay active attempting to lose weight would like to consider something for weight loss has been uses Saxenda  Lorazepam only occasionally    Four years ago had surgery on her axillary region to remove excess tissue but was unsuccessful she was told they took out her sweat glands and she did not sweat for years but now she notices that she is sweating under both arms  Still has not done colonoscopy or Cologuard        Current Outpatient Medications:     aluminum chloride (DRYSOL) 20 % external solution, Apply topically daily at bedtime, Disp: 35 mL, Rfl: 0    fluticasone (FLONASE) 50 mcg/act nasal spray, 2 sprays into each nostril as needed for rhinitis, Disp: 1 Bottle, Rfl: 6    Loratadine (CLARITIN PO), Take by mouth, Disp: , Rfl:     LORazepam (ATIVAN) 1 mg tablet, Take 1 tablet (1 mg total) by mouth daily, Disp: 90 tablet, Rfl: 2    No results found for this or any previous visit (from the past 1008 hour(s))  The following portions of the patient's history were reviewed and updated as appropriate: allergies, current medications, past family history, past medical history, past social history, past surgical history and problem list      Review of Systems   Constitutional: Negative for appetite change, chills, fatigue, fever and unexpected weight change  Sweating under arms     HENT: Negative for postnasal drip and sneezing  Eyes: Negative for visual disturbance  Respiratory: Negative for chest tightness and shortness of breath  Cardiovascular: Negative for chest pain, palpitations and leg swelling  Gastrointestinal: Negative for abdominal pain and blood in stool  Endocrine: Negative for cold intolerance, heat intolerance, polydipsia, polyphagia and polyuria  Genitourinary: Negative for difficulty urinating, dysuria, frequency and urgency  Musculoskeletal: Negative for arthralgias and myalgias  Skin: Negative for rash and wound  Neurological: Negative for dizziness, weakness, light-headedness and headaches  Hematological: Negative for adenopathy  Psychiatric/Behavioral: Negative for confusion, dysphoric mood and sleep disturbance  The patient is not nervous/anxious            Objective:      /80 (BP Location: Left arm, Patient Position: Sitting, Cuff Size: Standard)   Pulse 78   Temp 98 6 °F (37 °C) (Temporal) Comment: no nsaids  Resp 14   Ht 5' 6" (1 676 m)   Wt 75 4 kg (166 lb 3 2 oz)   BMI 26 83 kg/m²        Physical Exam   Constitutional: She is oriented to person, place, and time  She appears well-developed  No distress  HENT:   Head: Normocephalic and atraumatic  Nose: Nose normal    Mouth/Throat: Oropharynx is clear and moist    Eyes: Pupils are equal, round, and reactive to light  Conjunctivae and EOM are normal    Neck: Normal range of motion  Neck supple  No JVD present  No tracheal deviation present  No thyromegaly present  Cardiovascular: Normal rate, regular rhythm, normal heart sounds and intact distal pulses  Exam reveals no gallop and no friction rub  No murmur heard  Pulmonary/Chest: Effort normal and breath sounds normal  No respiratory distress  She has no wheezes  She has no rales  Abdominal: Soft  Bowel sounds are normal  She exhibits no distension  There is no tenderness  Musculoskeletal: Normal range of motion  She exhibits no edema  Lymphadenopathy:     She has no cervical adenopathy  Neurological: She is alert and oriented to person, place, and time  No cranial nerve deficit  Skin: Skin is warm and dry  No rash noted  She is not diaphoretic  Psychiatric: She has a normal mood and affect   Her behavior is normal  Judgment and thought content normal

## 2020-07-22 ENCOUNTER — HOSPITAL ENCOUNTER (OUTPATIENT)
Dept: MAMMOGRAPHY | Facility: CLINIC | Age: 60
Discharge: HOME | End: 2020-07-22
Payer: COMMERCIAL

## 2020-07-22 ENCOUNTER — APPOINTMENT (OUTPATIENT)
Dept: LAB | Facility: CLINIC | Age: 60
End: 2020-07-22
Payer: COMMERCIAL

## 2020-07-22 DIAGNOSIS — E55.9 VITAMIN D DEFICIENCY: ICD-10-CM

## 2020-07-22 DIAGNOSIS — Z78.0 POSTMENOPAUSAL: ICD-10-CM

## 2020-07-22 DIAGNOSIS — I10 ESSENTIAL HYPERTENSION: ICD-10-CM

## 2020-07-22 DIAGNOSIS — R73.01 IMPAIRED FASTING BLOOD SUGAR: ICD-10-CM

## 2020-07-22 DIAGNOSIS — E78.5 HYPERLIPIDEMIA, UNSPECIFIED HYPERLIPIDEMIA TYPE: ICD-10-CM

## 2020-07-22 LAB
25(OH)D3 SERPL-MCNC: 24.7 NG/ML (ref 30–100)
ALBUMIN SERPL BCP-MCNC: 3.7 G/DL (ref 3.5–5)
ALP SERPL-CCNC: 74 U/L (ref 46–116)
ALT SERPL W P-5'-P-CCNC: 42 U/L (ref 12–78)
ANION GAP SERPL CALCULATED.3IONS-SCNC: 7 MMOL/L (ref 4–13)
AST SERPL W P-5'-P-CCNC: 30 U/L (ref 5–45)
BASOPHILS # BLD AUTO: 0.03 THOUSANDS/ΜL (ref 0–0.1)
BASOPHILS NFR BLD AUTO: 1 % (ref 0–1)
BILIRUB SERPL-MCNC: 0.64 MG/DL (ref 0.2–1)
BUN SERPL-MCNC: 15 MG/DL (ref 5–25)
CALCIUM SERPL-MCNC: 9.2 MG/DL (ref 8.3–10.1)
CHLORIDE SERPL-SCNC: 106 MMOL/L (ref 100–108)
CHOLEST SERPL-MCNC: 267 MG/DL (ref 50–200)
CO2 SERPL-SCNC: 27 MMOL/L (ref 21–32)
CREAT SERPL-MCNC: 0.7 MG/DL (ref 0.6–1.3)
EOSINOPHIL # BLD AUTO: 0.11 THOUSAND/ΜL (ref 0–0.61)
EOSINOPHIL NFR BLD AUTO: 2 % (ref 0–6)
ERYTHROCYTE [DISTWIDTH] IN BLOOD BY AUTOMATED COUNT: 13 % (ref 11.6–15.1)
EST. AVERAGE GLUCOSE BLD GHB EST-MCNC: 100 MG/DL
GFR SERPL CREATININE-BSD FRML MDRD: 94 ML/MIN/1.73SQ M
GLUCOSE P FAST SERPL-MCNC: 96 MG/DL (ref 65–99)
HBA1C MFR BLD: 5.1 %
HCT VFR BLD AUTO: 39.7 % (ref 34.8–46.1)
HDLC SERPL-MCNC: 76 MG/DL
HGB BLD-MCNC: 12.9 G/DL (ref 11.5–15.4)
IMM GRANULOCYTES # BLD AUTO: 0.02 THOUSAND/UL (ref 0–0.2)
IMM GRANULOCYTES NFR BLD AUTO: 0 % (ref 0–2)
LDLC SERPL CALC-MCNC: 170 MG/DL (ref 0–100)
LYMPHOCYTES # BLD AUTO: 2.41 THOUSANDS/ΜL (ref 0.6–4.47)
LYMPHOCYTES NFR BLD AUTO: 38 % (ref 14–44)
MCH RBC QN AUTO: 32.9 PG (ref 26.8–34.3)
MCHC RBC AUTO-ENTMCNC: 32.5 G/DL (ref 31.4–37.4)
MCV RBC AUTO: 101 FL (ref 82–98)
MONOCYTES # BLD AUTO: 0.43 THOUSAND/ΜL (ref 0.17–1.22)
MONOCYTES NFR BLD AUTO: 7 % (ref 4–12)
NEUTROPHILS # BLD AUTO: 3.43 THOUSANDS/ΜL (ref 1.85–7.62)
NEUTS SEG NFR BLD AUTO: 52 % (ref 43–75)
NONHDLC SERPL-MCNC: 191 MG/DL
NRBC BLD AUTO-RTO: 0 /100 WBCS
PLATELET # BLD AUTO: 236 THOUSANDS/UL (ref 149–390)
PMV BLD AUTO: 10.6 FL (ref 8.9–12.7)
POTASSIUM SERPL-SCNC: 3.7 MMOL/L (ref 3.5–5.3)
PROT SERPL-MCNC: 7.3 G/DL (ref 6.4–8.2)
RBC # BLD AUTO: 3.92 MILLION/UL (ref 3.81–5.12)
SODIUM SERPL-SCNC: 140 MMOL/L (ref 136–145)
TRIGL SERPL-MCNC: 103 MG/DL
TSH SERPL DL<=0.05 MIU/L-ACNC: 0.49 UIU/ML (ref 0.36–3.74)
WBC # BLD AUTO: 6.43 THOUSAND/UL (ref 4.31–10.16)

## 2020-07-22 PROCEDURE — 82306 VITAMIN D 25 HYDROXY: CPT

## 2020-07-22 PROCEDURE — 83036 HEMOGLOBIN GLYCOSYLATED A1C: CPT

## 2020-07-22 PROCEDURE — 84443 ASSAY THYROID STIM HORMONE: CPT

## 2020-07-22 PROCEDURE — 85025 COMPLETE CBC W/AUTO DIFF WBC: CPT

## 2020-07-22 PROCEDURE — 36415 COLL VENOUS BLD VENIPUNCTURE: CPT

## 2020-07-22 PROCEDURE — 80053 COMPREHEN METABOLIC PANEL: CPT

## 2020-07-22 PROCEDURE — 80061 LIPID PANEL: CPT

## 2020-07-22 PROCEDURE — 77080 DXA BONE DENSITY AXIAL: CPT

## 2020-07-23 ENCOUNTER — TELEPHONE (OUTPATIENT)
Dept: INTERNAL MEDICINE CLINIC | Facility: CLINIC | Age: 60
End: 2020-07-23

## 2020-07-23 NOTE — TELEPHONE ENCOUNTER
----- Message from Marcial Cuevas sent at 7/23/2020 12:28 PM EDT -----  Have her do a virtual with me tomorrow to discuss her labs- nothing terrible just some things to roshan

## 2020-07-24 ENCOUNTER — TELEMEDICINE (OUTPATIENT)
Dept: INTERNAL MEDICINE CLINIC | Facility: CLINIC | Age: 60
End: 2020-07-24
Payer: COMMERCIAL

## 2020-07-24 DIAGNOSIS — I10 ESSENTIAL HYPERTENSION: ICD-10-CM

## 2020-07-24 DIAGNOSIS — E78.5 HYPERLIPIDEMIA, UNSPECIFIED HYPERLIPIDEMIA TYPE: ICD-10-CM

## 2020-07-24 DIAGNOSIS — E66.9 OBESITY, UNSPECIFIED CLASSIFICATION, UNSPECIFIED OBESITY TYPE, UNSPECIFIED WHETHER SERIOUS COMORBIDITY PRESENT: Primary | ICD-10-CM

## 2020-07-24 DIAGNOSIS — E55.9 VITAMIN D DEFICIENCY: ICD-10-CM

## 2020-07-24 DIAGNOSIS — R73.01 IMPAIRED FASTING BLOOD SUGAR: ICD-10-CM

## 2020-07-24 DIAGNOSIS — Z78.0 POSTMENOPAUSAL: ICD-10-CM

## 2020-07-24 PROCEDURE — 99214 OFFICE O/P EST MOD 30 MIN: CPT | Performed by: NURSE PRACTITIONER

## 2020-07-24 PROCEDURE — 3074F SYST BP LT 130 MM HG: CPT | Performed by: NURSE PRACTITIONER

## 2020-07-24 PROCEDURE — 3079F DIAST BP 80-89 MM HG: CPT | Performed by: NURSE PRACTITIONER

## 2020-07-24 PROCEDURE — 1036F TOBACCO NON-USER: CPT | Performed by: NURSE PRACTITIONER

## 2020-07-24 RX ORDER — CA/D3/MAG OX/ZINC/COP/MANG/BOR 600 MG-800
1 TABLET,CHEWABLE ORAL 2 TIMES DAILY
Qty: 60 TABLET | Refills: 4
Start: 2020-07-24 | End: 2020-10-22 | Stop reason: CLARIF

## 2020-07-24 NOTE — PROGRESS NOTES
Virtual Regular Visit      Assessment/Plan:    Problem List Items Addressed This Visit        Endocrine    Impaired fasting blood sugar    Relevant Orders    Hemoglobin A1C       Cardiovascular and Mediastinum    Hypertension      Other Visit Diagnoses     Obesity, unspecified classification, unspecified obesity type, unspecified whether serious comorbidity present    -  Primary    Relevant Medications    liraglutide (SAXENDA) injection    Insulin Pen Needle (BD Pen Needle Heather U/F) 32G X 4 MM MISC    Vitamin D deficiency        Hyperlipidemia, unspecified hyperlipidemia type        Relevant Orders    CBC and differential    Comprehensive metabolic panel    Lipid panel    TSH, 3rd generation with Free T4 reflex    Postmenopausal        Relevant Medications    Calcium Carbonate-Vit D-Min (CALTRATE 600+D PLUS MINERALS) 600-800 MG-UNIT CHEW    Other Relevant Orders    DXA bone density spine hip and pelvis               Reason for visit is   Chief Complaint   Patient presents with    Virtual Regular Visit     review labs        Encounter provider OMAR Qureshi    Provider located at [address] [place] [address]      Recent Visits  Date Type Provider Dept   07/23/20 Telephone Romana Ohms, [place] [address]   07/20/20 Office Visit Ritesh Qureshi 51   07/17/20 Telephone Wiliam Bishop 7 recent visits within past 7 days and meeting all other requirements     Today's Visits  Date Type Provider Dept   07/24/20 Telemedicine Wiliam Qureshi 7 today's visits and meeting all other requirements     Future Appointments  No visits were found meeting these conditions  Showing future appointments within next 150 days and meeting all other requirements        The patient was identified by name and date of birth   Albert Kenney Param Baez was informed that this is a telemedicine visit and that the visit is being conducted through [address] and patient was informed that this is not a secure, HIPAA-complaint platform  She agrees to proceed     My office door was closed  No one else was in the room  She acknowledged consent and understanding of privacy and security of the video platform  The patient has agreed to participate and understands they can discontinue the visit at any time  Patient is aware this is a billable service  Subjective  Samantha Lopez is a 62 y o  female [facility] and [facility]xa   HPI     History reviewed  No pertinent past medical history  Past Surgical History:   Procedure Laterality Date    APPENDECTOMY         Current Outpatient Medications   Medication Sig Dispense Refill    aluminum chloride (DRYSOL) 20 % external solution Apply topically daily at bedtime 35 mL 0    fluticasone (FLONASE) 50 mcg/act nasal spray 2 sprays into each nostril as needed for rhinitis 1 Bottle 6    Loratadine (CLARITIN PO) Take by mouth      LORazepam (ATIVAN) 1 mg tablet Take 1 tablet (1 mg total) by mouth daily 90 tablet 2    Calcium Carbonate-Vit D-Min (CALTRATE 600+D PLUS MINERALS) 600-800 MG-UNIT CHEW Chew 1 tablet 2 (two) times a day 60 tablet 4    Insulin Pen Needle (BD Pen Needle Heather U/F) 32G X 4 MM MISC by Does not apply route daily 100 each 2    liraglutide (SAXENDA) injection Start   6mg daily and titrate up weekly to max dose of 3mg 1 pen 5     No current facility-administered medications for this visit  No Known Allergies    Review of Systems   Constitutional: Negative for appetite change, chills, diaphoresis, fatigue, fever and unexpected weight change  HENT: Negative for postnasal drip and sneezing  Eyes: Negative for visual disturbance  Respiratory: Negative for chest tightness and shortness of breath  Cardiovascular: Negative for chest pain, palpitations and leg swelling  Gastrointestinal: Negative for abdominal pain and blood in stool  Endocrine: Negative for cold intolerance, heat intolerance, polydipsia, polyphagia and polyuria  Genitourinary: Negative for difficulty urinating, dysuria, frequency and urgency  Musculoskeletal: Negative for arthralgias and myalgias  Skin: Negative for rash and wound  Neurological: Negative for dizziness, weakness, light-headedness and headaches  Hematological: Negative for adenopathy  Psychiatric/Behavioral: Negative for confusion, dysphoric mood and sleep disturbance  The patient is not nervous/anxious  Video Exam    There were no vitals filed for this visit  Physical Exam   Constitutional: She appears well-developed and well-nourished  She is active and cooperative  HENT:   Head: Normocephalic  Eyes: Pupils are equal, round, and reactive to light  Neck: Normal range of motion  Pulmonary/Chest: Effort normal    Neurological: She is alert  Skin: Skin is warm  Psychiatric: She has a normal mood and affect  I spent 15  minutes directly with the patient during this visit this visit would be a 00184  assessment and plan     hyperlipidemia she had a cyst significant rise in her LDL from 120-170 discussed the importance of lifestyle modifications diet exercise weight loss continue to monitor closely     macrocytosis patient does admit that she is drinking about 3 cocktails per night  Discussed the importance of curtailing down to 1     obesity she would like to start Almer Saliva  Risk benefit side effects discussed with patient  She will start with 0 6 mg titrate up she each dose per week until max dose of 3 mg follow back in 6 weeks     osteopenia discussed the importance of lifestyle modifications exercise start calcium with vitamin-D      VIRTUAL VISIT DISCLAIMER    Malika Cook acknowledges that she has consented to an online visit or consultation   She understands that the online visit is based solely on information provided by her, and that, in the absence of a face-to-face physical evaluation by the physician, the diagnosis she receives is both limited and provisional in terms of accuracy and completeness  This is not intended to replace a full medical face-to-face evaluation by the physician  Meredith Ramos understands and accepts these terms

## 2020-09-15 DIAGNOSIS — F41.9 ANXIETY: ICD-10-CM

## 2020-09-15 RX ORDER — LORAZEPAM 1 MG/1
TABLET ORAL
Qty: 90 TABLET | Refills: 0 | Status: SHIPPED | OUTPATIENT
Start: 2020-09-15 | End: 2020-12-29

## 2020-10-22 ENCOUNTER — TELEMEDICINE (OUTPATIENT)
Dept: INTERNAL MEDICINE CLINIC | Facility: CLINIC | Age: 60
End: 2020-10-22
Payer: COMMERCIAL

## 2020-10-22 DIAGNOSIS — B37.9 CANDIDIASIS: Primary | ICD-10-CM

## 2020-10-22 DIAGNOSIS — T78.40XS ALLERGY, SEQUELA: ICD-10-CM

## 2020-10-22 PROCEDURE — 99213 OFFICE O/P EST LOW 20 MIN: CPT | Performed by: NURSE PRACTITIONER

## 2020-10-22 PROCEDURE — 3725F SCREEN DEPRESSION PERFORMED: CPT | Performed by: NURSE PRACTITIONER

## 2020-10-22 PROCEDURE — 1036F TOBACCO NON-USER: CPT | Performed by: NURSE PRACTITIONER

## 2020-10-22 RX ORDER — NYSTATIN 100000 [USP'U]/G
POWDER TOPICAL
Qty: 15 G | Refills: 0 | Status: SHIPPED | OUTPATIENT
Start: 2020-10-22 | End: 2021-10-22 | Stop reason: CLARIF

## 2020-10-22 RX ORDER — FLUTICASONE PROPIONATE 50 MCG
2 SPRAY, SUSPENSION (ML) NASAL AS NEEDED
Qty: 1 BOTTLE | Refills: 6 | Status: SHIPPED | OUTPATIENT
Start: 2020-10-22 | End: 2021-10-22 | Stop reason: CLARIF

## 2020-12-26 ENCOUNTER — OFFICE VISIT (OUTPATIENT)
Dept: URGENT CARE | Facility: CLINIC | Age: 60
End: 2020-12-26
Payer: COMMERCIAL

## 2020-12-26 VITALS
RESPIRATION RATE: 18 BRPM | BODY MASS INDEX: 27.14 KG/M2 | OXYGEN SATURATION: 98 % | DIASTOLIC BLOOD PRESSURE: 82 MMHG | WEIGHT: 159 LBS | HEART RATE: 85 BPM | TEMPERATURE: 98 F | SYSTOLIC BLOOD PRESSURE: 120 MMHG | HEIGHT: 64 IN

## 2020-12-26 DIAGNOSIS — R43.2 LOSS OF TASTE: Primary | ICD-10-CM

## 2020-12-26 DIAGNOSIS — R11.0 NAUSEA: ICD-10-CM

## 2020-12-26 PROCEDURE — G0381 LEV 2 HOSP TYPE B ED VISIT: HCPCS

## 2020-12-26 PROCEDURE — U0003 INFECTIOUS AGENT DETECTION BY NUCLEIC ACID (DNA OR RNA); SEVERE ACUTE RESPIRATORY SYNDROME CORONAVIRUS 2 (SARS-COV-2) (CORONAVIRUS DISEASE [COVID-19]), AMPLIFIED PROBE TECHNIQUE, MAKING USE OF HIGH THROUGHPUT TECHNOLOGIES AS DESCRIBED BY CMS-2020-01-R: HCPCS | Performed by: PHYSICIAN ASSISTANT

## 2020-12-26 RX ORDER — ONDANSETRON 4 MG/1
4 TABLET, ORALLY DISINTEGRATING ORAL EVERY 6 HOURS PRN
Qty: 20 TABLET | Refills: 0 | Status: SHIPPED | OUTPATIENT
Start: 2020-12-26 | End: 2021-10-22 | Stop reason: CLARIF

## 2020-12-27 LAB — SARS-COV-2 RNA RESP QL NAA+PROBE: NEGATIVE

## 2020-12-28 ENCOUNTER — TELEPHONE (OUTPATIENT)
Dept: URGENT CARE | Facility: CLINIC | Age: 60
End: 2020-12-28

## 2020-12-29 DIAGNOSIS — F41.9 ANXIETY: ICD-10-CM

## 2020-12-29 RX ORDER — LORAZEPAM 1 MG/1
TABLET ORAL
Qty: 90 TABLET | Refills: 0 | Status: SHIPPED | OUTPATIENT
Start: 2020-12-29 | End: 2021-04-08

## 2021-03-10 ENCOUNTER — TELEPHONE (OUTPATIENT)
Dept: INTERNAL MEDICINE CLINIC | Facility: CLINIC | Age: 61
End: 2021-03-10

## 2021-03-10 NOTE — TELEPHONE ENCOUNTER
She needs a note from her dr so she can get the covid vaccine  She is her husbands main care provider and she was told if she has a letter she can get the vaccine  Please call if we will do this for her  [phone]

## 2021-03-10 NOTE — TELEPHONE ENCOUNTER
Who told her she needed a letter? There is a rumor going around that if you care for a family member that makes you eligible for a vaccine  Dileep not seen this backed up by the department of health or the CDC  So Im just curious as to who said she needed a note  Then the next question is  Aby Odom What medical conditions does her  have that she needs to care for him?

## 2021-03-11 NOTE — TELEPHONE ENCOUNTER
Pt said she is not really sure where she heard that from maybe the news or facebook  She said her  is 68years old and she take care of the household  If he get sick she would need to care for him  Pt  name is Cyndie Maru [birth year]

## 2021-04-08 DIAGNOSIS — F41.9 ANXIETY: ICD-10-CM

## 2021-04-08 RX ORDER — LORAZEPAM 1 MG/1
TABLET ORAL
Qty: 90 TABLET | Refills: 0 | Status: SHIPPED | OUTPATIENT
Start: 2021-04-08 | End: 2021-07-08

## 2021-07-08 DIAGNOSIS — F41.9 ANXIETY: ICD-10-CM

## 2021-07-08 RX ORDER — LORAZEPAM 1 MG/1
TABLET ORAL
Qty: 90 TABLET | Refills: 0 | Status: SHIPPED | OUTPATIENT
Start: 2021-07-08 | End: 2021-10-05

## 2021-09-15 ENCOUNTER — HOSPITAL ENCOUNTER (OUTPATIENT)
Dept: MAMMOGRAPHY | Facility: CLINIC | Age: 61
Discharge: HOME | End: 2021-09-15
Payer: COMMERCIAL

## 2021-09-15 DIAGNOSIS — Z12.31 SCREENING MAMMOGRAM FOR HIGH-RISK PATIENT: ICD-10-CM

## 2021-09-15 PROCEDURE — 77067 SCR MAMMO BI INCL CAD: CPT

## 2021-09-15 PROCEDURE — 77063 BREAST TOMOSYNTHESIS BI: CPT

## 2021-10-05 DIAGNOSIS — F41.9 ANXIETY: ICD-10-CM

## 2021-10-05 RX ORDER — LORAZEPAM 1 MG/1
TABLET ORAL
Qty: 90 TABLET | Refills: 0 | Status: SHIPPED | OUTPATIENT
Start: 2021-10-05 | End: 2021-12-28

## 2021-10-12 ENCOUNTER — APPOINTMENT (OUTPATIENT)
Dept: LAB | Facility: CLINIC | Age: 61
End: 2021-10-12
Payer: COMMERCIAL

## 2021-10-12 ENCOUNTER — TELEPHONE (OUTPATIENT)
Dept: INTERNAL MEDICINE CLINIC | Facility: CLINIC | Age: 61
End: 2021-10-12

## 2021-10-12 DIAGNOSIS — E78.5 HYPERLIPIDEMIA, UNSPECIFIED HYPERLIPIDEMIA TYPE: ICD-10-CM

## 2021-10-12 DIAGNOSIS — E55.9 VITAMIN D DEFICIENCY: ICD-10-CM

## 2021-10-12 DIAGNOSIS — R73.01 IMPAIRED FASTING BLOOD SUGAR: ICD-10-CM

## 2021-10-12 DIAGNOSIS — I10 ESSENTIAL HYPERTENSION: ICD-10-CM

## 2021-10-12 LAB
25(OH)D3 SERPL-MCNC: 43.1 NG/ML (ref 30–100)
ALBUMIN SERPL BCP-MCNC: 3.8 G/DL (ref 3.5–5)
ALP SERPL-CCNC: 54 U/L (ref 46–116)
ALT SERPL W P-5'-P-CCNC: 32 U/L (ref 12–78)
ANION GAP SERPL CALCULATED.3IONS-SCNC: 4 MMOL/L (ref 4–13)
AST SERPL W P-5'-P-CCNC: 31 U/L (ref 5–45)
BASOPHILS # BLD AUTO: 0.05 THOUSANDS/ΜL (ref 0–0.1)
BASOPHILS NFR BLD AUTO: 1 % (ref 0–1)
BILIRUB SERPL-MCNC: 0.51 MG/DL (ref 0.2–1)
BUN SERPL-MCNC: 14 MG/DL (ref 5–25)
CALCIUM SERPL-MCNC: 10.1 MG/DL (ref 8.3–10.1)
CHLORIDE SERPL-SCNC: 105 MMOL/L (ref 100–108)
CHOLEST SERPL-MCNC: 242 MG/DL (ref 50–200)
CO2 SERPL-SCNC: 29 MMOL/L (ref 21–32)
CREAT SERPL-MCNC: 0.8 MG/DL (ref 0.6–1.3)
EOSINOPHIL # BLD AUTO: 0.11 THOUSAND/ΜL (ref 0–0.61)
EOSINOPHIL NFR BLD AUTO: 2 % (ref 0–6)
ERYTHROCYTE [DISTWIDTH] IN BLOOD BY AUTOMATED COUNT: 12.2 % (ref 11.6–15.1)
EST. AVERAGE GLUCOSE BLD GHB EST-MCNC: 97 MG/DL
GFR SERPL CREATININE-BSD FRML MDRD: 79 ML/MIN/1.73SQ M
GLUCOSE SERPL-MCNC: 92 MG/DL (ref 65–140)
HBA1C MFR BLD: 5 %
HCT VFR BLD AUTO: 38.1 % (ref 34.8–46.1)
HDLC SERPL-MCNC: 94 MG/DL
HGB BLD-MCNC: 12.4 G/DL (ref 11.5–15.4)
IMM GRANULOCYTES # BLD AUTO: 0.02 THOUSAND/UL (ref 0–0.2)
IMM GRANULOCYTES NFR BLD AUTO: 0 % (ref 0–2)
LDLC SERPL CALC-MCNC: 135 MG/DL (ref 0–100)
LYMPHOCYTES # BLD AUTO: 2.6 THOUSANDS/ΜL (ref 0.6–4.47)
LYMPHOCYTES NFR BLD AUTO: 36 % (ref 14–44)
MCH RBC QN AUTO: 33.7 PG (ref 26.8–34.3)
MCHC RBC AUTO-ENTMCNC: 32.5 G/DL (ref 31.4–37.4)
MCV RBC AUTO: 104 FL (ref 82–98)
MONOCYTES # BLD AUTO: 0.47 THOUSAND/ΜL (ref 0.17–1.22)
MONOCYTES NFR BLD AUTO: 6 % (ref 4–12)
NEUTROPHILS # BLD AUTO: 4.05 THOUSANDS/ΜL (ref 1.85–7.62)
NEUTS SEG NFR BLD AUTO: 55 % (ref 43–75)
NONHDLC SERPL-MCNC: 148 MG/DL
NRBC BLD AUTO-RTO: 0 /100 WBCS
PLATELET # BLD AUTO: 268 THOUSANDS/UL (ref 149–390)
PMV BLD AUTO: 10 FL (ref 8.9–12.7)
POTASSIUM SERPL-SCNC: 4.7 MMOL/L (ref 3.5–5.3)
PROT SERPL-MCNC: 7.3 G/DL (ref 6.4–8.2)
RBC # BLD AUTO: 3.68 MILLION/UL (ref 3.81–5.12)
SODIUM SERPL-SCNC: 138 MMOL/L (ref 136–145)
TRIGL SERPL-MCNC: 67 MG/DL
TSH SERPL DL<=0.05 MIU/L-ACNC: 0.47 UIU/ML (ref 0.36–3.74)
WBC # BLD AUTO: 7.3 THOUSAND/UL (ref 4.31–10.16)

## 2021-10-12 PROCEDURE — 80061 LIPID PANEL: CPT

## 2021-10-12 PROCEDURE — 82306 VITAMIN D 25 HYDROXY: CPT

## 2021-10-12 PROCEDURE — 80053 COMPREHEN METABOLIC PANEL: CPT

## 2021-10-12 PROCEDURE — 83036 HEMOGLOBIN GLYCOSYLATED A1C: CPT

## 2021-10-12 PROCEDURE — 84443 ASSAY THYROID STIM HORMONE: CPT

## 2021-10-12 PROCEDURE — 85025 COMPLETE CBC W/AUTO DIFF WBC: CPT

## 2021-10-12 PROCEDURE — 36415 COLL VENOUS BLD VENIPUNCTURE: CPT

## 2021-10-22 ENCOUNTER — OFFICE VISIT (OUTPATIENT)
Dept: INTERNAL MEDICINE CLINIC | Facility: CLINIC | Age: 61
End: 2021-10-22
Payer: COMMERCIAL

## 2021-10-22 VITALS
SYSTOLIC BLOOD PRESSURE: 130 MMHG | HEART RATE: 100 BPM | BODY MASS INDEX: 25.27 KG/M2 | TEMPERATURE: 98 F | HEIGHT: 64 IN | DIASTOLIC BLOOD PRESSURE: 86 MMHG | WEIGHT: 148 LBS | OXYGEN SATURATION: 98 %

## 2021-10-22 DIAGNOSIS — I10 ESSENTIAL HYPERTENSION: ICD-10-CM

## 2021-10-22 DIAGNOSIS — E78.5 HYPERLIPIDEMIA, UNSPECIFIED HYPERLIPIDEMIA TYPE: ICD-10-CM

## 2021-10-22 DIAGNOSIS — R73.01 IMPAIRED FASTING BLOOD SUGAR: ICD-10-CM

## 2021-10-22 DIAGNOSIS — M25.512 LEFT SHOULDER PAIN, UNSPECIFIED CHRONICITY: ICD-10-CM

## 2021-10-22 DIAGNOSIS — Z12.4 SCREENING FOR CERVICAL CANCER: Primary | ICD-10-CM

## 2021-10-22 DIAGNOSIS — E55.9 VITAMIN D DEFICIENCY: ICD-10-CM

## 2021-10-22 PROCEDURE — 3725F SCREEN DEPRESSION PERFORMED: CPT | Performed by: NURSE PRACTITIONER

## 2021-10-22 PROCEDURE — 99396 PREV VISIT EST AGE 40-64: CPT | Performed by: NURSE PRACTITIONER

## 2021-10-22 PROCEDURE — 1036F TOBACCO NON-USER: CPT | Performed by: NURSE PRACTITIONER

## 2021-10-22 PROCEDURE — 3008F BODY MASS INDEX DOCD: CPT | Performed by: NURSE PRACTITIONER

## 2021-11-01 ENCOUNTER — TELEPHONE (OUTPATIENT)
Dept: INTERNAL MEDICINE CLINIC | Facility: CLINIC | Age: 61
End: 2021-11-01

## 2021-12-01 ENCOUNTER — TELEPHONE (OUTPATIENT)
Dept: INTERNAL MEDICINE CLINIC | Facility: CLINIC | Age: 61
End: 2021-12-01

## 2021-12-27 DIAGNOSIS — F41.9 ANXIETY: ICD-10-CM

## 2021-12-28 DIAGNOSIS — F41.9 ANXIETY: ICD-10-CM

## 2021-12-28 RX ORDER — LORAZEPAM 1 MG/1
TABLET ORAL
Qty: 90 TABLET | Refills: 0 | Status: SHIPPED | OUTPATIENT
Start: 2021-12-28 | End: 2021-12-30 | Stop reason: SDUPTHER

## 2021-12-28 NOTE — TELEPHONE ENCOUNTER
Tyson York has to call the [facility]; so they fill the [place]    They won't fill it until 12/30  She only has 1/2 pill left

## 2021-12-29 ENCOUNTER — TELEPHONE (OUTPATIENT)
Dept: INTERNAL MEDICINE CLINIC | Facility: CLINIC | Age: 61
End: 2021-12-29

## 2021-12-29 DIAGNOSIS — F41.9 ANXIETY: ICD-10-CM

## 2021-12-29 RX ORDER — LORAZEPAM 1 MG/1
1 TABLET ORAL DAILY
Qty: 90 TABLET | Refills: 0 | Status: CANCELLED | OUTPATIENT
Start: 2021-12-29

## 2021-12-29 RX ORDER — LORAZEPAM 1 MG/1
1 TABLET ORAL DAILY
Qty: 90 TABLET | Refills: 0 | OUTPATIENT
Start: 2021-12-29

## 2021-12-30 ENCOUNTER — TELEPHONE (OUTPATIENT)
Dept: INTERNAL MEDICINE CLINIC | Facility: CLINIC | Age: 61
End: 2021-12-30

## 2021-12-30 NOTE — TELEPHONE ENCOUNTER
6th phone call pt is very frustrated    Pt isn't due for renewal of lorazepam until 01/02/21  Won't fill rx unless dr calls and speaks with Ron Mishra the pharmacist directly    Siobhan Boston- [phone]

## 2022-02-17 DIAGNOSIS — T78.40XS ALLERGY, SEQUELA: ICD-10-CM

## 2022-02-17 DIAGNOSIS — E66.9 OBESITY, UNSPECIFIED CLASSIFICATION, UNSPECIFIED OBESITY TYPE, UNSPECIFIED WHETHER SERIOUS COMORBIDITY PRESENT: ICD-10-CM

## 2022-02-17 RX ORDER — FLUTICASONE PROPIONATE 50 MCG
2 SPRAY, SUSPENSION (ML) NASAL DAILY
Qty: 16 G | Refills: 0 | Status: SHIPPED | OUTPATIENT
Start: 2022-02-17 | End: 2022-03-11

## 2022-03-11 DIAGNOSIS — T78.40XS ALLERGY, SEQUELA: ICD-10-CM

## 2022-03-11 RX ORDER — FLUTICASONE PROPIONATE 50 MCG
SPRAY, SUSPENSION (ML) NASAL
Qty: 16 ML | Refills: 0 | Status: SHIPPED | OUTPATIENT
Start: 2022-03-11 | End: 2022-04-14

## 2022-03-23 ENCOUNTER — TELEPHONE (OUTPATIENT)
Dept: OTHER | Facility: OTHER | Age: 62
End: 2022-03-23

## 2022-04-14 DIAGNOSIS — T78.40XS ALLERGY, SEQUELA: ICD-10-CM

## 2022-04-14 DIAGNOSIS — F41.9 ANXIETY: ICD-10-CM

## 2022-04-14 RX ORDER — FLUTICASONE PROPIONATE 50 MCG
SPRAY, SUSPENSION (ML) NASAL
Qty: 16 ML | Refills: 0 | Status: SHIPPED | OUTPATIENT
Start: 2022-04-14 | End: 2022-04-20

## 2022-04-17 RX ORDER — LORAZEPAM 1 MG/1
TABLET ORAL
Qty: 90 TABLET | Refills: 0 | Status: SHIPPED | OUTPATIENT
Start: 2022-04-17 | End: 2022-08-10

## 2022-04-19 DIAGNOSIS — E66.9 OBESITY, UNSPECIFIED CLASSIFICATION, UNSPECIFIED OBESITY TYPE, UNSPECIFIED WHETHER SERIOUS COMORBIDITY PRESENT: ICD-10-CM

## 2022-04-20 DIAGNOSIS — T78.40XS ALLERGY, SEQUELA: ICD-10-CM

## 2022-04-20 RX ORDER — FLUTICASONE PROPIONATE 50 MCG
SPRAY, SUSPENSION (ML) NASAL
Qty: 48 ML | Refills: 1 | Status: SHIPPED | OUTPATIENT
Start: 2022-04-20

## 2022-04-20 RX ORDER — LIRAGLUTIDE 6 MG/ML
INJECTION, SOLUTION SUBCUTANEOUS
Qty: 3 ML | Refills: 5 | Status: SHIPPED | OUTPATIENT
Start: 2022-04-20

## 2022-04-21 DIAGNOSIS — E66.9 OBESITY, UNSPECIFIED CLASSIFICATION, UNSPECIFIED OBESITY TYPE, UNSPECIFIED WHETHER SERIOUS COMORBIDITY PRESENT: ICD-10-CM

## 2022-04-21 DIAGNOSIS — F41.9 ANXIETY: ICD-10-CM

## 2022-04-21 DIAGNOSIS — B00.9 HSV INFECTION: ICD-10-CM

## 2022-04-21 DIAGNOSIS — T78.40XS ALLERGY, SEQUELA: ICD-10-CM

## 2022-04-21 RX ORDER — LIRAGLUTIDE 6 MG/ML
INJECTION, SOLUTION SUBCUTANEOUS
Qty: 3 ML | Refills: 5 | OUTPATIENT
Start: 2022-04-21

## 2022-04-21 RX ORDER — PEN NEEDLE, DIABETIC 32GX 5/32"
NEEDLE, DISPOSABLE MISCELLANEOUS DAILY
Qty: 100 EACH | Refills: 2 | OUTPATIENT
Start: 2022-04-21

## 2022-04-21 RX ORDER — LORAZEPAM 1 MG/1
1 TABLET ORAL DAILY
Qty: 90 TABLET | Refills: 0 | OUTPATIENT
Start: 2022-04-21

## 2022-04-21 RX ORDER — VALACYCLOVIR HYDROCHLORIDE 1 G/1
TABLET, FILM COATED ORAL
Qty: 8 TABLET | Refills: 3 | OUTPATIENT
Start: 2022-04-21 | End: 2022-04-22

## 2022-04-21 RX ORDER — FLUTICASONE PROPIONATE 50 MCG
SPRAY, SUSPENSION (ML) NASAL
Qty: 48 ML | Refills: 1 | OUTPATIENT
Start: 2022-04-21

## 2022-04-21 NOTE — TELEPHONE ENCOUNTER
So I filled liraglutide and the ativan the other day why is it coming again  Also there are no direction in the flonase

## 2022-04-21 NOTE — TELEPHONE ENCOUNTER
Pt changed pharmacy from [facility] to [facility]  Prince from [facility] called [facility] to have all pts scripts moved to them  [facility] stated they sent them, but [facility] never received  [facility] states they can not send again      Can the prescriptions along with the (2) scripts from yesterday (fluticasone FLONASE 50 mcg/act nasal spray and Saxenda inj) be sent to [facility] / [facility] [address] / [address]>    Pharm # [phone]

## 2022-06-07 ENCOUNTER — TELEPHONE (OUTPATIENT)
Dept: INTERNAL MEDICINE CLINIC | Facility: CLINIC | Age: 62
End: 2022-06-07

## 2022-08-10 DIAGNOSIS — F41.9 ANXIETY: ICD-10-CM

## 2022-08-10 RX ORDER — LORAZEPAM 1 MG/1
TABLET ORAL
Qty: 90 TABLET | Refills: 0 | Status: SHIPPED | OUTPATIENT
Start: 2022-08-10

## 2022-11-09 DIAGNOSIS — F41.9 ANXIETY: ICD-10-CM

## 2022-11-10 DIAGNOSIS — I10 PRIMARY HYPERTENSION: ICD-10-CM

## 2022-11-10 DIAGNOSIS — F41.9 ANXIETY DISORDER, UNSPECIFIED TYPE: ICD-10-CM

## 2022-11-10 DIAGNOSIS — R73.01 IMPAIRED FASTING BLOOD SUGAR: Primary | ICD-10-CM

## 2022-11-10 DIAGNOSIS — Z13.6 SCREENING FOR CARDIOVASCULAR CONDITION: ICD-10-CM

## 2022-11-10 DIAGNOSIS — E55.9 VITAMIN D DEFICIENCY: ICD-10-CM

## 2022-11-10 RX ORDER — LORAZEPAM 1 MG/1
TABLET ORAL
Qty: 90 TABLET | Refills: 0 | Status: SHIPPED | OUTPATIENT
Start: 2022-11-10

## 2023-02-22 DIAGNOSIS — F41.9 ANXIETY: ICD-10-CM

## 2023-02-23 DIAGNOSIS — F41.9 ANXIETY: ICD-10-CM

## 2023-02-23 RX ORDER — LORAZEPAM 1 MG/1
1 TABLET ORAL DAILY
Qty: 90 TABLET | Refills: 0 | OUTPATIENT
Start: 2023-02-23

## 2023-02-23 RX ORDER — LORAZEPAM 1 MG/1
1 TABLET ORAL DAILY
Qty: 7 TABLET | Refills: 0 | Status: SHIPPED | OUTPATIENT
Start: 2023-02-23 | End: 2023-03-02 | Stop reason: SDUPTHER

## 2023-02-23 NOTE — TELEPHONE ENCOUNTER
Spoke with: patient  Re:  Refill request denied  Needs labs/appt  Provider's message/resuts/instructions relayed in full detail  Comments:    Appt  Scheduled mitali/ Maksim Cardoza Thursday, March 2  Would you please refill quantity needed until appt  Pt stated she only has 3 days worth left

## 2023-02-23 NOTE — TELEPHONE ENCOUNTER
So we sent a message in November that she needed labs and an appt   At this time I will have to decline the medication

## 2023-02-27 ENCOUNTER — APPOINTMENT (OUTPATIENT)
Dept: LAB | Facility: CLINIC | Age: 63
End: 2023-02-27

## 2023-02-27 DIAGNOSIS — E55.9 VITAMIN D DEFICIENCY: ICD-10-CM

## 2023-02-27 DIAGNOSIS — Z13.6 SCREENING FOR CARDIOVASCULAR CONDITION: ICD-10-CM

## 2023-02-27 DIAGNOSIS — F41.9 ANXIETY DISORDER, UNSPECIFIED TYPE: ICD-10-CM

## 2023-02-27 DIAGNOSIS — R73.01 IMPAIRED FASTING BLOOD SUGAR: ICD-10-CM

## 2023-02-27 DIAGNOSIS — I10 PRIMARY HYPERTENSION: ICD-10-CM

## 2023-02-28 LAB
25(OH)D3 SERPL-MCNC: 32.7 NG/ML (ref 30–100)
ALBUMIN SERPL BCP-MCNC: 4 G/DL (ref 3.5–5)
ALP SERPL-CCNC: 43 U/L (ref 46–116)
ALT SERPL W P-5'-P-CCNC: 28 U/L (ref 12–78)
ANION GAP SERPL CALCULATED.3IONS-SCNC: 6 MMOL/L (ref 4–13)
AST SERPL W P-5'-P-CCNC: 27 U/L (ref 5–45)
BASOPHILS # BLD AUTO: 0.04 THOUSANDS/ÂΜL (ref 0–0.1)
BASOPHILS NFR BLD AUTO: 1 % (ref 0–1)
BILIRUB SERPL-MCNC: 0.52 MG/DL (ref 0.2–1)
BUN SERPL-MCNC: 19 MG/DL (ref 5–25)
CALCIUM SERPL-MCNC: 9.8 MG/DL (ref 8.3–10.1)
CHLORIDE SERPL-SCNC: 104 MMOL/L (ref 96–108)
CHOLEST SERPL-MCNC: 262 MG/DL
CO2 SERPL-SCNC: 29 MMOL/L (ref 21–32)
CREAT SERPL-MCNC: 0.63 MG/DL (ref 0.6–1.3)
EOSINOPHIL # BLD AUTO: 0.15 THOUSAND/ÂΜL (ref 0–0.61)
EOSINOPHIL NFR BLD AUTO: 2 % (ref 0–6)
ERYTHROCYTE [DISTWIDTH] IN BLOOD BY AUTOMATED COUNT: 12.2 % (ref 11.6–15.1)
EST. AVERAGE GLUCOSE BLD GHB EST-MCNC: 88 MG/DL
GFR SERPL CREATININE-BSD FRML MDRD: 95 ML/MIN/1.73SQ M
GLUCOSE P FAST SERPL-MCNC: 114 MG/DL (ref 65–99)
HBA1C MFR BLD: 4.7 %
HCT VFR BLD AUTO: 37.6 % (ref 34.8–46.1)
HDLC SERPL-MCNC: 115 MG/DL
HGB BLD-MCNC: 12.6 G/DL (ref 11.5–15.4)
IMM GRANULOCYTES # BLD AUTO: 0.01 THOUSAND/UL (ref 0–0.2)
IMM GRANULOCYTES NFR BLD AUTO: 0 % (ref 0–2)
LDLC SERPL CALC-MCNC: 135 MG/DL (ref 0–100)
LYMPHOCYTES # BLD AUTO: 2.28 THOUSANDS/ÂΜL (ref 0.6–4.47)
LYMPHOCYTES NFR BLD AUTO: 37 % (ref 14–44)
MCH RBC QN AUTO: 34.6 PG (ref 26.8–34.3)
MCHC RBC AUTO-ENTMCNC: 33.5 G/DL (ref 31.4–37.4)
MCV RBC AUTO: 103 FL (ref 82–98)
MONOCYTES # BLD AUTO: 0.49 THOUSAND/ÂΜL (ref 0.17–1.22)
MONOCYTES NFR BLD AUTO: 8 % (ref 4–12)
NEUTROPHILS # BLD AUTO: 3.16 THOUSANDS/ÂΜL (ref 1.85–7.62)
NEUTS SEG NFR BLD AUTO: 52 % (ref 43–75)
NONHDLC SERPL-MCNC: 147 MG/DL
NRBC BLD AUTO-RTO: 0 /100 WBCS
PLATELET # BLD AUTO: 227 THOUSANDS/UL (ref 149–390)
PMV BLD AUTO: 10.8 FL (ref 8.9–12.7)
POTASSIUM SERPL-SCNC: 4.3 MMOL/L (ref 3.5–5.3)
PROT SERPL-MCNC: 7.2 G/DL (ref 6.4–8.4)
RBC # BLD AUTO: 3.64 MILLION/UL (ref 3.81–5.12)
SODIUM SERPL-SCNC: 139 MMOL/L (ref 135–147)
TRIGL SERPL-MCNC: 58 MG/DL
TSH SERPL DL<=0.05 MIU/L-ACNC: 0.89 UIU/ML (ref 0.45–4.5)
WBC # BLD AUTO: 6.13 THOUSAND/UL (ref 4.31–10.16)

## 2023-03-02 ENCOUNTER — OFFICE VISIT (OUTPATIENT)
Dept: INTERNAL MEDICINE CLINIC | Facility: CLINIC | Age: 63
End: 2023-03-02

## 2023-03-02 VITALS
HEART RATE: 78 BPM | HEIGHT: 64 IN | DIASTOLIC BLOOD PRESSURE: 76 MMHG | TEMPERATURE: 98.5 F | RESPIRATION RATE: 16 BRPM | BODY MASS INDEX: 26.15 KG/M2 | SYSTOLIC BLOOD PRESSURE: 118 MMHG | WEIGHT: 153.2 LBS

## 2023-03-02 DIAGNOSIS — E66.9 OBESITY, UNSPECIFIED CLASSIFICATION, UNSPECIFIED OBESITY TYPE, UNSPECIFIED WHETHER SERIOUS COMORBIDITY PRESENT: ICD-10-CM

## 2023-03-02 DIAGNOSIS — R73.01 IFG (IMPAIRED FASTING GLUCOSE): ICD-10-CM

## 2023-03-02 DIAGNOSIS — R73.01 IMPAIRED FASTING BLOOD SUGAR: ICD-10-CM

## 2023-03-02 DIAGNOSIS — F41.9 ANXIETY: ICD-10-CM

## 2023-03-02 DIAGNOSIS — Z12.4 SCREENING FOR CERVICAL CANCER: ICD-10-CM

## 2023-03-02 DIAGNOSIS — Z12.31 BREAST CANCER SCREENING BY MAMMOGRAM: ICD-10-CM

## 2023-03-02 DIAGNOSIS — Z12.11 SCREENING FOR COLON CANCER: Primary | ICD-10-CM

## 2023-03-02 DIAGNOSIS — I10 PRIMARY HYPERTENSION: ICD-10-CM

## 2023-03-02 DIAGNOSIS — Z13.6 SCREENING FOR CARDIOVASCULAR CONDITION: ICD-10-CM

## 2023-03-02 DIAGNOSIS — M25.512 LEFT SHOULDER PAIN, UNSPECIFIED CHRONICITY: ICD-10-CM

## 2023-03-02 DIAGNOSIS — F41.9 ANXIETY DISORDER, UNSPECIFIED TYPE: ICD-10-CM

## 2023-03-02 DIAGNOSIS — R53.83 OTHER FATIGUE: ICD-10-CM

## 2023-03-02 RX ORDER — LORAZEPAM 1 MG/1
1 TABLET ORAL DAILY
Qty: 7 TABLET | Refills: 0 | Status: SHIPPED | OUTPATIENT
Start: 2023-03-02 | End: 2023-03-06 | Stop reason: SDUPTHER

## 2023-03-02 RX ORDER — IBUPROFEN 600 MG/1
600 TABLET ORAL EVERY 8 HOURS SCHEDULED
Qty: 60 TABLET | Refills: 0 | Status: SHIPPED | OUTPATIENT
Start: 2023-03-02

## 2023-03-02 RX ORDER — LIRAGLUTIDE 6 MG/ML
INJECTION, SOLUTION SUBCUTANEOUS
Qty: 15 ML | Refills: 4 | Status: CANCELLED | OUTPATIENT
Start: 2023-03-02

## 2023-03-02 RX ORDER — LIRAGLUTIDE 6 MG/ML
INJECTION, SOLUTION SUBCUTANEOUS
Qty: 15 ML | Refills: 4 | Status: SHIPPED | OUTPATIENT
Start: 2023-03-02

## 2023-03-02 RX ORDER — PEN NEEDLE, DIABETIC 32GX 5/32"
NEEDLE, DISPOSABLE MISCELLANEOUS DAILY
Qty: 100 EACH | Refills: 2 | Status: SHIPPED | OUTPATIENT
Start: 2023-03-02

## 2023-03-02 NOTE — PROGRESS NOTES
INTERNAL MEDICINE FOLLOW-UP VISIT  [facility] - [facility] [address] [place] [address]    NAME: Peri Garcia  AGE: 57 y o  SEX: female  : 1960     DATE: 3/2/2023     Assessment and Plan:   1  Anxiety  Stable w/ prn  - LORazepam (ATIVAN) 1 mg tablet; Take 1 tablet (1 mg total) by mouth daily  Dispense: 7 tablet; Refill: 0    2  Obesity, unspecified classification, unspecified obesity type, unspecified whether serious comorbidity present  Now on maintenance dose keeping at 150's lbs  - Insulin Pen Needle (BD Pen Needle Heather U/F) 32G X 4 MM MISC; Use daily  Dispense: 100 each; Refill: 2  - liraglutide (Saxenda) injection; Take 1 2mg daily  Dispense: 15 mL; Refill: 4    3  Screening for colon cancer  - Cologuard    4  Screening for cervical cancer  - Ambulatory Referral to Gynecology; Future    5  Impaired fasting blood sugar  aic amazing at 4 7    6  Primary hypertension  At [facility] without medication  - CBC and differential; Future  - Comprehensive metabolic panel; Future    7  Screening for cardiovascular condition  - Lipid panel; Future    8 macrocystosis   Has curtailed her drinking  Continue to monitor    10  Breast cancer screening by mammogram  - Mammo screening bilateral w 3d & cad; Future    11  Left shoulder pain, unspecified chronicity  - ibuprofen (MOTRIN) 600 mg tablet; Take 1 tablet (600 mg total) by mouth every 8 (eight) hours  Dispense: 60 tablet; Refill: 0      BMI Counseling: Body mass index is 26 71 kg/m²  The BMI is above normal  Nutrition recommendations include decreasing portion sizes and consuming healthier snacks  Exercise recommendations include exercising 3-5 times per week  No pharmacotherapy was ordered  Rationale for BMI follow-up plan is due to patient being overweight or obese  Depression Screening and Follow-up Plan: Patient was screened for depression during today's encounter  They screened negative with a PHQ-2 score of 0  No follow-ups on file  Chief Complaint:     Chief Complaint   Patient presents with   • Medication Refill      History of Present Illness:     Here for annual visit  Doing well   Following low carb diet  Ativan prn for anxiety  Doing well on saxenda for maintenance of weight  Would prefer not to do screenings b/c doesn't want to know if she has cancer  Itchy ears lately    The following portions of the patient's history were reviewed and updated as appropriate: allergies, current medications, past family history, past medical history, past social history, past surgical history and problem list      Review of Systems:     Review of Systems   Constitutional: Negative for appetite change, chills, diaphoresis, fatigue, fever and unexpected weight change  HENT: Negative for postnasal drip and sneezing  Eyes: Negative for visual disturbance  Respiratory: Negative for chest tightness and shortness of breath  Cardiovascular: Negative for chest pain, palpitations and leg swelling  Gastrointestinal: Negative for abdominal pain and blood in stool  Endocrine: Negative for cold intolerance, heat intolerance, polydipsia, polyphagia and polyuria  Genitourinary: Negative for difficulty urinating, dysuria, frequency and urgency  Musculoskeletal: Positive for arthralgias  Negative for myalgias  Skin: Negative for rash and wound  Neurological: Negative for dizziness, weakness, light-headedness and headaches  Hematological: Negative for adenopathy  Psychiatric/Behavioral: Negative for confusion, dysphoric mood and sleep disturbance  The patient is not nervous/anxious  Past Medical History:   History reviewed  No pertinent past medical history       Current Medications:     Current Outpatient Medications:   •  fluticasone (FLONASE) 50 mcg/act nasal spray, SPRAY 2 SPRAYS INTO EACH NOSTRIL EVERY DAY, Disp: 48 mL, Rfl: 1  •  ibuprofen (MOTRIN) 600 mg tablet, Take 1 tablet (600 mg total) by mouth every 8 (eight) hours, Disp: 60 tablet, Rfl: 0  •  Insulin Pen Needle (BD Pen Needle Heather U/F) 32G X 4 MM MISC, Use daily, Disp: 100 each, Rfl: 2  •  liraglutide (Saxenda) injection, Take 1 2mg daily, Disp: 15 mL, Rfl: 4  •  LORazepam (ATIVAN) 1 mg tablet, Take 1 tablet (1 mg total) by mouth daily, Disp: 7 tablet, Rfl: 0  •  valACYclovir (VALTREX) 1,000 mg tablet, 2 po q 12 hours x 2 doses, Disp: 8 tablet, Rfl: 3     Allergies:   No Known Allergies     Physical Exam:     /76 (BP Location: Left arm, Patient Position: Sitting, Cuff Size: Standard)   Pulse 78   Temp 98 5 °F (36 9 °C) (Oral)   Resp 16   Ht 5' 3 5" (1 613 m)   Wt 69 5 kg (153 lb 3 2 oz)   BMI 26 71 kg/m²     Physical Exam  Constitutional:       Appearance: She is well-developed  HENT:      Head: Normocephalic and atraumatic  Eyes:      Pupils: Pupils are equal, round, and reactive to light  Neck:      Thyroid: No thyromegaly  Cardiovascular:      Rate and Rhythm: Normal rate and regular rhythm  Heart sounds: No murmur heard  Pulmonary:      Effort: Pulmonary effort is normal       Breath sounds: Normal breath sounds  Abdominal:      General: Bowel sounds are normal       Palpations: Abdomen is soft  Musculoskeletal:         General: Normal range of motion  Cervical back: Normal range of motion and neck supple  Lymphadenopathy:      Cervical: No cervical adenopathy  Skin:     General: Skin is warm and dry  Neurological:      Mental Status: She is alert and oriented to person, place, and time  Data:     Laboratory Results: I have personally reviewed the pertinent laboratory results/reports   Radiology/Other Diagnostic Testing Results: I have personally reviewed pertinent reports        OMAR Yao  [facility]

## 2023-03-06 DIAGNOSIS — F41.9 ANXIETY: ICD-10-CM

## 2023-03-06 RX ORDER — LORAZEPAM 1 MG/1
1 TABLET ORAL DAILY
Qty: 7 TABLET | Refills: 0 | Status: CANCELLED | OUTPATIENT
Start: 2023-03-06

## 2023-03-07 RX ORDER — LORAZEPAM 1 MG/1
1 TABLET ORAL DAILY
Qty: 90 TABLET | Refills: 0 | Status: SHIPPED | OUTPATIENT
Start: 2023-03-07

## 2023-06-01 ENCOUNTER — HOSPITAL ENCOUNTER (OUTPATIENT)
Dept: MAMMOGRAPHY | Facility: CLINIC | Age: 63
End: 2023-06-01
Payer: COMMERCIAL

## 2023-06-01 ENCOUNTER — TELEPHONE (OUTPATIENT)
Age: 63
End: 2023-06-01

## 2023-06-01 VITALS — WEIGHT: 153 LBS | HEIGHT: 64 IN | BODY MASS INDEX: 26.12 KG/M2

## 2023-06-01 DIAGNOSIS — Z12.31 BREAST CANCER SCREENING BY MAMMOGRAM: ICD-10-CM

## 2023-06-01 PROCEDURE — 77067 SCR MAMMO BI INCL CAD: CPT

## 2023-06-01 PROCEDURE — 77063 BREAST TOMOSYNTHESIS BI: CPT

## 2023-06-01 NOTE — TELEPHONE ENCOUNTER
----- Message from [id number] sent at 6/1/2023  4:44 PM EDT -----  Normal mammogram repeat in 1 year

## 2023-06-01 NOTE — TELEPHONE ENCOUNTER
----- Message from [id number] sent at 6/1/2023  4:44 PM EDT -----  Normal mammogram repeat in 1 year

## 2023-06-07 DIAGNOSIS — M25.512 LEFT SHOULDER PAIN, UNSPECIFIED CHRONICITY: ICD-10-CM

## 2023-06-08 DIAGNOSIS — F41.9 ANXIETY: ICD-10-CM

## 2023-06-08 RX ORDER — IBUPROFEN 600 MG/1
600 TABLET ORAL EVERY 8 HOURS SCHEDULED
Qty: 60 TABLET | Refills: 0 | Status: SHIPPED | OUTPATIENT
Start: 2023-06-08

## 2023-06-11 RX ORDER — LORAZEPAM 1 MG/1
1 TABLET ORAL DAILY
Qty: 90 TABLET | Refills: 0 | Status: SHIPPED | OUTPATIENT
Start: 2023-06-11

## 2023-07-17 ENCOUNTER — TELEPHONE (OUTPATIENT)
Age: 63
End: 2023-07-17

## 2023-07-17 NOTE — TELEPHONE ENCOUNTER
Voice mail is full.  Could not leave message for patient to remind her to send (cologuard) specimen to the the lab at the earliest.

## 2023-07-22 LAB — COLOGUARD RESULT REPORTABLE: NORMAL

## 2023-09-07 DIAGNOSIS — M25.512 LEFT SHOULDER PAIN, UNSPECIFIED CHRONICITY: ICD-10-CM

## 2023-09-07 DIAGNOSIS — F41.9 ANXIETY: ICD-10-CM

## 2023-09-07 RX ORDER — LORAZEPAM 1 MG/1
1 TABLET ORAL DAILY
Qty: 90 TABLET | Refills: 0 | Status: SHIPPED | OUTPATIENT
Start: 2023-09-07

## 2023-09-07 RX ORDER — IBUPROFEN 600 MG/1
600 TABLET ORAL EVERY 8 HOURS SCHEDULED
Qty: 60 TABLET | Refills: 0 | Status: SHIPPED | OUTPATIENT
Start: 2023-09-07

## 2023-09-21 ENCOUNTER — TELEPHONE (OUTPATIENT)
Age: 63
End: 2023-09-21

## 2023-09-21 NOTE — TELEPHONE ENCOUNTER
T/c from pt - pt is at the pharmacy right now -  Saxenda inj is temporally out of stock / backordered -  pharmacist would like to give  the option to sent an alternative - Mounjaro (all dosage) are in stock / available. Ok to change? Pt requesting to expedite her request - in the meantime another call was received from Pharmacy directly - pharmacists spoke with Lester Ballesteros, call was ended as we took care of this matter.     [facility] - GLADIS Wong - [address]        Please advise

## 2023-09-22 NOTE — TELEPHONE ENCOUNTER
Called and LM informing patient an appointment is needed and to call back to schedule. continue Aricept. continue Aricept

## 2023-09-27 ENCOUNTER — OFFICE VISIT (OUTPATIENT)
Age: 63
End: 2023-09-27
Payer: COMMERCIAL

## 2023-09-27 VITALS
BODY MASS INDEX: 27.83 KG/M2 | DIASTOLIC BLOOD PRESSURE: 74 MMHG | HEART RATE: 74 BPM | HEIGHT: 64 IN | WEIGHT: 163 LBS | SYSTOLIC BLOOD PRESSURE: 122 MMHG | OXYGEN SATURATION: 98 %

## 2023-09-27 DIAGNOSIS — R73.01 IMPAIRED FASTING BLOOD SUGAR: Primary | ICD-10-CM

## 2023-09-27 DIAGNOSIS — F41.9 ANXIETY DISORDER, UNSPECIFIED TYPE: ICD-10-CM

## 2023-09-27 DIAGNOSIS — I10 PRIMARY HYPERTENSION: ICD-10-CM

## 2023-09-27 PROCEDURE — 99214 OFFICE O/P EST MOD 30 MIN: CPT

## 2023-09-27 NOTE — PROGRESS NOTES
INTERNAL MEDICINE FOLLOW-UP VISIT  [facility] - [facility]IC[facility] [address]    NAME: Paxton Paige  AGE: 70 y.o. SEX: female  : 1960     DATE: 2023     Assessment and Plan:   1. Impaired fasting blood sugar  Has been tolerating Saxenda however does not want to do daily injections anymore for weight loss will pay out-of-pocket for. Patient has heard about mounjaro and would like to start this medication for weight loss. Discussed with patient that Jaylen Shipley is being used off label for weight loss and that it is not FDA approved for weight loss at this time. Jaylen Shipley is a GIP/GLP1 and is similar to ozempic/victoza. I discussed with the patient that there are other medications that are FDA approved for weight loss however the patient would like to continue with Jaylen Shipley prescription. Side effects discussed with patient, including risks for thyroid issues as well as studies showing a risk for thyroid cancer. Studies have shown Mounjaro assists with weight loss when combined with diet and exercise. The patient would like to continue with this prescription. Will evaluate patient again in the office in 4 weeks. - tirzepatide 2.5 MG/0.5ML; Inject 0.5 mL (2.5 mg total) under the skin every 7 days  Dispense: 2 mL; Refill: 1    2. Primary hypertension  Stable with no medication use. No home BP checks reported    3. Anxiety disorder, unspecified type  Stable with intermittent use of lorazepam. [place] PDMP or [place]  reviewed: No red flags were identified    Health maintenance  cologuard- sent in sample        No follow-ups on file. Chief Complaint:     Chief Complaint   Patient presents with   • Med Change Request     Discuss once a week injection. History of Present Illness:     Patient is here today to discuss changing medications from [place] to Jaylen Shipley.   I did discuss with her that Jaylen Shipley was not approved at this time for weight loss and most likely her insurance will not pay. She does want to try to pay out-of-pocket for this medication. She has no history of thyroid cancers. She has done well in the past on Saxenda with very little GI side effects. The following portions of the patient's history were reviewed and updated as appropriate: allergies, current medications, past family history, past medical history, past social history, past surgical history and problem list.     Review of Systems:     Review of Systems   Constitutional: Negative for appetite change, chills, diaphoresis, fatigue, fever and unexpected weight change. HENT: Negative for postnasal drip and sneezing. Eyes: Negative for visual disturbance. Respiratory: Negative for chest tightness and shortness of breath. Cardiovascular: Negative for chest pain, palpitations and leg swelling. Gastrointestinal: Negative for abdominal pain and blood in stool. Endocrine: Negative for cold intolerance, heat intolerance, polydipsia, polyphagia and polyuria. Genitourinary: Negative for difficulty urinating, dysuria, frequency and urgency. Musculoskeletal: Negative for arthralgias and myalgias. Skin: Negative for rash and wound. Neurological: Negative for dizziness, weakness, light-headedness and headaches. Hematological: Negative for adenopathy. Psychiatric/Behavioral: Negative for confusion, dysphoric mood and sleep disturbance. The patient is not nervous/anxious. Past Medical History:   History reviewed. No pertinent past medical history.      Current Medications:     Current Outpatient Medications:   •  fluticasone (FLONASE) 50 mcg/act nasal spray, SPRAY 2 SPRAYS INTO EACH NOSTRIL EVERY DAY, Disp: 48 mL, Rfl: 1  •  ibuprofen (MOTRIN) 600 mg tablet, Take 1 tablet (600 mg total) by mouth every 8 (eight) hours, Disp: 60 tablet, Rfl: 0  •  Insulin Pen Needle (BD Pen Needle Heather U/F) 32G X 4 MM MISC, Use daily, Disp: 100 each, Rfl: 2  •  liraglutide (Saxenda) injection, Take 1.2mg daily, Disp: 15 mL, Rfl: 4  •  LORazepam (ATIVAN) 1 mg tablet, Take 1 tablet (1 mg total) by mouth daily, Disp: 90 tablet, Rfl: 0  •  tirzepatide 2.5 MG/0.5ML, Inject 0.5 mL (2.5 mg total) under the skin every 7 days, Disp: 2 mL, Rfl: 1  •  valACYclovir (VALTREX) 1,000 mg tablet, 2 po q 12 hours x 2 doses, Disp: 8 tablet, Rfl: 3     Allergies:   No Known Allergies     Physical Exam:     /74 (BP Location: Left arm, Patient Position: Sitting, Cuff Size: Standard)   Pulse 74   Ht 5' 3.5" (1.613 m)   Wt 73.9 kg (163 lb)   SpO2 98%   BMI 28.42 kg/m²     Physical Exam  Constitutional:       Appearance: She is well-developed. HENT:      Head: Normocephalic and atraumatic. Eyes:      Pupils: Pupils are equal, round, and reactive to light. Neck:      Thyroid: No thyromegaly. Cardiovascular:      Rate and Rhythm: Normal rate and regular rhythm. Heart sounds: No murmur heard. Pulmonary:      Effort: Pulmonary effort is normal.      Breath sounds: Normal breath sounds. Abdominal:      General: Bowel sounds are normal.      Palpations: Abdomen is soft. Musculoskeletal:         General: Normal range of motion. Cervical back: Normal range of motion and neck supple. Lymphadenopathy:      Cervical: No cervical adenopathy. Skin:     General: Skin is warm and dry. Neurological:      Mental Status: She is alert and oriented to person, place, and time. Data:     Laboratory Results: I have personally reviewed the pertinent laboratory results/reports   Radiology/Other Diagnostic Testing Results: I have personally reviewed pertinent reports.       Sarah Falk, [address] [facility] [address]ICINE [address]

## 2023-09-28 ENCOUNTER — APPOINTMENT (OUTPATIENT)
Dept: LAB | Facility: CLINIC | Age: 63
End: 2023-09-28
Payer: COMMERCIAL

## 2023-09-28 PROCEDURE — 80053 COMPREHEN METABOLIC PANEL: CPT

## 2023-09-28 PROCEDURE — 84443 ASSAY THYROID STIM HORMONE: CPT

## 2023-09-28 PROCEDURE — 80061 LIPID PANEL: CPT

## 2023-09-28 PROCEDURE — 83036 HEMOGLOBIN GLYCOSYLATED A1C: CPT

## 2023-09-28 PROCEDURE — 85025 COMPLETE CBC W/AUTO DIFF WBC: CPT

## 2023-09-29 ENCOUNTER — TELEPHONE (OUTPATIENT)
Age: 63
End: 2023-09-29

## 2023-09-29 NOTE — TELEPHONE ENCOUNTER
----- Message from [address] sent at 9/29/2023  9:05 AM EDT -----  We will review again at your follow-up appointment in November but overall labs look okay cholesterol is elevated limit carbs and sweets in your diet

## 2023-10-25 DIAGNOSIS — R73.01 IMPAIRED FASTING BLOOD SUGAR: ICD-10-CM

## 2023-10-25 RX ORDER — TIRZEPATIDE 2.5 MG/.5ML
INJECTION, SOLUTION SUBCUTANEOUS
Qty: 2 ML | Refills: 1 | Status: SHIPPED | OUTPATIENT
Start: 2023-10-25

## 2023-12-14 DIAGNOSIS — F41.9 ANXIETY: ICD-10-CM

## 2023-12-14 RX ORDER — LORAZEPAM 1 MG/1
1 TABLET ORAL DAILY
Qty: 90 TABLET | Refills: 0 | Status: SHIPPED | OUTPATIENT
Start: 2023-12-14

## 2024-01-24 DIAGNOSIS — R73.01 IMPAIRED FASTING BLOOD SUGAR: ICD-10-CM

## 2024-01-25 RX ORDER — TIRZEPATIDE 2.5 MG/.5ML
INJECTION, SOLUTION SUBCUTANEOUS
Qty: 2 ML | Refills: 0 | Status: SHIPPED | OUTPATIENT
Start: 2024-01-25

## 2024-03-04 ENCOUNTER — TELEPHONE (OUTPATIENT)
Age: 64
End: 2024-03-04

## 2024-03-04 DIAGNOSIS — F41.9 ANXIETY: ICD-10-CM

## 2024-03-04 RX ORDER — LORAZEPAM 1 MG/1
1 TABLET ORAL DAILY
Qty: 90 TABLET | Refills: 0 | Status: SHIPPED | OUTPATIENT
Start: 2024-03-04

## 2024-05-20 ENCOUNTER — TELEPHONE (OUTPATIENT)
Age: 64
End: 2024-05-20

## 2024-05-20 DIAGNOSIS — F41.9 ANXIETY: ICD-10-CM

## 2024-05-20 DIAGNOSIS — Z12.31 ENCOUNTER FOR SCREENING MAMMOGRAM FOR BREAST CANCER: Primary | ICD-10-CM

## 2024-05-20 NOTE — TELEPHONE ENCOUNTER
Medication: LORazepam (ATIVAN) 1 mg tablet     Dose/Frequency: Take 1 tablet (1 mg total) by mouth daily     Quantity: 90 tablet     Pharmacy: [facility] - [address] [facility]     Office:   [x] PCP/Provider -   [] Speciality/Provider -     Does the patient have enough for 3 days?   [x] Yes   [] No - Send as HP to POD

## 2024-05-21 RX ORDER — LORAZEPAM 1 MG/1
1 TABLET ORAL DAILY
Qty: 90 TABLET | Refills: 0 | Status: SHIPPED | OUTPATIENT
Start: 2024-05-21

## 2024-08-15 DIAGNOSIS — F41.9 ANXIETY: ICD-10-CM

## 2024-08-15 NOTE — TELEPHONE ENCOUNTER
Patient called Rx Refill Line inquiring status on LORazepam (ATIVAN) 1 mg tablet. Request was sent today and is pending, patient informed a refill can take anywhere from 24 to 72 hours. Patient expressed concern due to only having 2 left.     Please send to: [facility] - [place] - [address] [facility].

## 2024-08-16 RX ORDER — LORAZEPAM 1 MG/1
1 TABLET ORAL DAILY
Qty: 90 TABLET | Refills: 0 | Status: SHIPPED | OUTPATIENT
Start: 2024-08-16

## 2024-10-23 ENCOUNTER — HOSPITAL ENCOUNTER (OUTPATIENT)
Dept: MAMMOGRAPHY | Facility: CLINIC | Age: 64
Discharge: HOME | End: 2024-10-23
Payer: COMMERCIAL

## 2024-10-23 VITALS — WEIGHT: 163 LBS | HEIGHT: 64 IN | BODY MASS INDEX: 27.83 KG/M2

## 2024-10-23 DIAGNOSIS — Z12.31 ENCOUNTER FOR SCREENING MAMMOGRAM FOR BREAST CANCER: ICD-10-CM

## 2024-10-23 PROCEDURE — 77067 SCR MAMMO BI INCL CAD: CPT

## 2024-10-23 PROCEDURE — 77063 BREAST TOMOSYNTHESIS BI: CPT

## 2024-11-04 DIAGNOSIS — F41.9 ANXIETY: ICD-10-CM

## 2024-11-04 RX ORDER — LORAZEPAM 1 MG/1
1 TABLET ORAL DAILY
Qty: 90 TABLET | Refills: 0 | Status: SHIPPED | OUTPATIENT
Start: 2024-11-04 | End: 2024-11-07 | Stop reason: SDUPTHER

## 2024-11-07 ENCOUNTER — TELEPHONE (OUTPATIENT)
Age: 64
End: 2024-11-07

## 2024-11-07 DIAGNOSIS — Z13.0 SCREENING FOR DEFICIENCY ANEMIA: Primary | ICD-10-CM

## 2024-11-07 DIAGNOSIS — F41.9 ANXIETY: ICD-10-CM

## 2024-11-07 DIAGNOSIS — R53.83 OTHER FATIGUE: ICD-10-CM

## 2024-11-07 DIAGNOSIS — R73.01 IMPAIRED FASTING GLUCOSE: ICD-10-CM

## 2024-11-07 DIAGNOSIS — Z13.220 SCREENING FOR LIPID DISORDERS: ICD-10-CM

## 2024-11-07 RX ORDER — LORAZEPAM 1 MG/1
1 TABLET ORAL DAILY
Qty: 10 TABLET | Refills: 0 | Status: SHIPPED | OUTPATIENT
Start: 2024-11-07

## 2024-11-07 RX ORDER — LORAZEPAM 1 MG/1
1 TABLET ORAL DAILY
Qty: 10 TABLET | Refills: 0 | Status: SHIPPED | OUTPATIENT
Start: 2024-11-07 | End: 2024-11-07 | Stop reason: SDUPTHER

## 2024-11-07 NOTE — TELEPHONE ENCOUNTER
Patient is calling regarding her Lorazeapam prescription, she has been without it for 2 days and really needs it.  It is 5 days early on prescription but [facility] said that if she can get approval, they will fill it for her.    Please advise. Thank you.

## 2024-11-07 NOTE — TELEPHONE ENCOUNTER
This was sent 11/4, can you please call [facility] pharmacy to see what the issue is. Thanks! If they need a verbal to fill it, please provide one.

## 2024-11-07 NOTE — TELEPHONE ENCOUNTER
Patient returned missed call.  Scheduled first available appointment that works for her of 11/18/24.    Please place lab orders so patient can complete prior to 11/18/24 appointment.    Patient request short order fill of medication because she is completely out and have been without medication for the past two days.    Please call patient back when lab orders have been placed and script sent to pharmacy.

## 2024-11-07 NOTE — TELEPHONE ENCOUNTER
[facility] pharmacy in [place] said the patients for Ativan is 10 days early. Informed them sharri Angulo is not hear and she did fill a 90 day supply in August so she will have to wait the 10 days to get refill if she cannot pay out of pocket for 10 pills

## 2024-11-07 NOTE — TELEPHONE ENCOUNTER
Pharmacy will not fill med for patient that was sent 11/4. Zuleima called to clarify how she is taking this medicine as it is written 1 pill daily. Patient seen by Kavita and Sheri, will be informed an appointment is needed.

## 2024-11-07 NOTE — TELEPHONE ENCOUNTER
Pharmacy states they got disconnected. Says if Adele writes a note that it is okay to refill today then they will. They just need a response to if it is okay to fill or if patient will need to wait the 10 days. They are requesting a call back tomorrow to advise either way.

## 2024-11-07 NOTE — TELEPHONE ENCOUNTER
Patient called, for an update on the refill request for her medication. She would like a call back, please advise.    Thank you .

## 2024-11-15 PROBLEM — Z86.79 HISTORY OF CEREBRAL PARENCHYMAL HEMORRHAGE: Status: RESOLVED | Noted: 2024-11-15 | Resolved: 2024-11-15

## 2024-11-18 ENCOUNTER — OFFICE VISIT (OUTPATIENT)
Age: 64
End: 2024-11-18
Payer: COMMERCIAL

## 2024-11-18 VITALS
DIASTOLIC BLOOD PRESSURE: 88 MMHG | BODY MASS INDEX: 28.85 KG/M2 | RESPIRATION RATE: 16 BRPM | HEART RATE: 66 BPM | OXYGEN SATURATION: 95 % | SYSTOLIC BLOOD PRESSURE: 140 MMHG | HEIGHT: 64 IN | WEIGHT: 169 LBS

## 2024-11-18 DIAGNOSIS — Z12.4 PAP SMEAR FOR CERVICAL CANCER SCREENING: ICD-10-CM

## 2024-11-18 DIAGNOSIS — R73.01 IMPAIRED FASTING BLOOD SUGAR: ICD-10-CM

## 2024-11-18 DIAGNOSIS — E66.811 CLASS 1 OBESITY DUE TO EXCESS CALORIES WITHOUT SERIOUS COMORBIDITY WITH BODY MASS INDEX (BMI) OF 30.0 TO 30.9 IN ADULT: ICD-10-CM

## 2024-11-18 DIAGNOSIS — Z12.11 SCREENING FOR COLON CANCER: ICD-10-CM

## 2024-11-18 DIAGNOSIS — Z00.00 ANNUAL PHYSICAL EXAM: Primary | ICD-10-CM

## 2024-11-18 DIAGNOSIS — Z78.0 POSTMENOPAUSAL: ICD-10-CM

## 2024-11-18 DIAGNOSIS — F41.1 GENERALIZED ANXIETY DISORDER: ICD-10-CM

## 2024-11-18 DIAGNOSIS — E66.3 OVERWEIGHT (BMI 25.0-29.9): ICD-10-CM

## 2024-11-18 DIAGNOSIS — I10 PRIMARY HYPERTENSION: ICD-10-CM

## 2024-11-18 DIAGNOSIS — E66.09 CLASS 1 OBESITY DUE TO EXCESS CALORIES WITHOUT SERIOUS COMORBIDITY WITH BODY MASS INDEX (BMI) OF 30.0 TO 30.9 IN ADULT: ICD-10-CM

## 2024-11-18 DIAGNOSIS — F41.9 ANXIETY: ICD-10-CM

## 2024-11-18 PROCEDURE — 99397 PER PM REEVAL EST PAT 65+ YR: CPT

## 2024-11-18 RX ORDER — TIRZEPATIDE 2.5 MG/.5ML
2.5 INJECTION, SOLUTION SUBCUTANEOUS WEEKLY
Qty: 2 ML | Refills: 0 | Status: SHIPPED | OUTPATIENT
Start: 2024-11-18 | End: 2024-12-16

## 2024-11-18 RX ORDER — LORAZEPAM 1 MG/1
TABLET ORAL
Qty: 60 TABLET | Refills: 0 | Status: SHIPPED | OUTPATIENT
Start: 2024-11-18

## 2024-11-18 RX ORDER — LORAZEPAM 1 MG/1
TABLET ORAL
Qty: 60 TABLET | Refills: 0 | Status: SHIPPED | OUTPATIENT
Start: 2024-11-18 | End: 2024-11-18 | Stop reason: SDUPTHER

## 2024-11-18 RX ORDER — TIRZEPATIDE 2.5 MG/.5ML
2.5 INJECTION, SOLUTION SUBCUTANEOUS WEEKLY
Qty: 2 ML | Refills: 0 | Status: SHIPPED | OUTPATIENT
Start: 2024-11-18 | End: 2024-11-18
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
## 2025-01-06 DIAGNOSIS — F41.9 ANXIETY: ICD-10-CM

## 2025-01-07 RX ORDER — LORAZEPAM 1 MG/1
TABLET ORAL
Qty: 60 TABLET | Refills: 0 | Status: SHIPPED | OUTPATIENT
Start: 2025-01-07

## 2025-01-30 DIAGNOSIS — E66.811 CLASS 1 OBESITY DUE TO EXCESS CALORIES WITHOUT SERIOUS COMORBIDITY WITH BODY MASS INDEX (BMI) OF 30.0 TO 30.9 IN ADULT: ICD-10-CM

## 2025-01-30 DIAGNOSIS — E66.09 CLASS 1 OBESITY DUE TO EXCESS CALORIES WITHOUT SERIOUS COMORBIDITY WITH BODY MASS INDEX (BMI) OF 30.0 TO 30.9 IN ADULT: ICD-10-CM

## 2025-01-31 RX ORDER — TIRZEPATIDE 2.5 MG/.5ML
2.5 INJECTION, SOLUTION SUBCUTANEOUS WEEKLY
Qty: 2 ML | Refills: 0 | Status: SHIPPED | OUTPATIENT
Start: 2025-01-31 | End: 2025-02-28

## 2025-04-24 ENCOUNTER — TELEPHONE (OUTPATIENT)
Age: 65
End: 2025-04-24

## 2025-04-24 DIAGNOSIS — E66.09 CLASS 1 OBESITY DUE TO EXCESS CALORIES WITHOUT SERIOUS COMORBIDITY WITH BODY MASS INDEX (BMI) OF 30.0 TO 30.9 IN ADULT: Primary | ICD-10-CM

## 2025-04-24 DIAGNOSIS — E66.811 CLASS 1 OBESITY DUE TO EXCESS CALORIES WITHOUT SERIOUS COMORBIDITY WITH BODY MASS INDEX (BMI) OF 30.0 TO 30.9 IN ADULT: Primary | ICD-10-CM

## 2025-04-24 RX ORDER — TIRZEPATIDE 5 MG/.5ML
5 INJECTION, SOLUTION SUBCUTANEOUS WEEKLY
Qty: 2 ML | Refills: 0 | Status: SHIPPED | OUTPATIENT
Start: 2025-04-24

## 2025-04-24 NOTE — TELEPHONE ENCOUNTER
Patient called with a request to increase her Saxenda to the next level.  She stated she is currently on Saxenda 2.5 mg.      Patient is out of the medication and would like the medication sent to the pharmacy on file.    Please advise, thank you.

## 2025-05-08 DIAGNOSIS — F41.9 ANXIETY: ICD-10-CM

## 2025-05-08 RX ORDER — LORAZEPAM 1 MG/1
1 TABLET ORAL DAILY
Qty: 90 TABLET | Refills: 0 | Status: SHIPPED | OUTPATIENT
Start: 2025-05-08

## 2025-05-08 NOTE — TELEPHONE ENCOUNTER
Patient called to request a refill for their Ativan advised a refill was requested on 5/8/25 and is pending approval. Patient verbalized understanding and is in agreement.     Does the patient have enough for 3 days?   [] Yes   [x] No - Send as HP to POD     Pt is at pharmacy now and wants done while she is there - will send HP- did advised pt the provider will sign and send the Rx when she can and it might not be immediately - pt asked to be transferred to office advised I can send the message HP for her and pt verbalized understand and disconnected call

## 2025-05-30 DIAGNOSIS — E66.811 CLASS 1 OBESITY DUE TO EXCESS CALORIES WITHOUT SERIOUS COMORBIDITY WITH BODY MASS INDEX (BMI) OF 30.0 TO 30.9 IN ADULT: ICD-10-CM

## 2025-05-30 DIAGNOSIS — E66.09 CLASS 1 OBESITY DUE TO EXCESS CALORIES WITHOUT SERIOUS COMORBIDITY WITH BODY MASS INDEX (BMI) OF 30.0 TO 30.9 IN ADULT: ICD-10-CM

## 2025-05-30 RX ORDER — TIRZEPATIDE 5 MG/.5ML
5 INJECTION, SOLUTION SUBCUTANEOUS WEEKLY
Qty: 2 ML | Refills: 0 | Status: SHIPPED | OUTPATIENT
Start: 2025-05-30

## 2025-05-30 NOTE — TELEPHONE ENCOUNTER
Reason for call:   [x] Refill   [] Prior Auth  [] Other:     Office: INTERNAL [facility] [address]   [x] PCP/Provider - Adele Garcia   [] Specialty/Provider -     Medication: zepbound     Dose/Frequency: 5 mg/ 0.5 ml/ Inject 0.5 mL (5 mg total) under the skin once a week     Quantity: 28 day supply     Pharmacy: [facility] in [place], [place]     Local Pharmacy   Does the patient have enough for 3 days?   [] Yes   [] No - Send as HP to POD    Mail Away Pharmacy   Does the patient have enough for 10 days?   [] Yes   [x] No - Send as HP to POD- out completely.

## 2025-07-09 DIAGNOSIS — F41.9 ANXIETY: ICD-10-CM

## 2025-07-09 RX ORDER — LORAZEPAM 1 MG/1
1 TABLET ORAL 2 TIMES DAILY
Qty: 90 TABLET | Refills: 0 | Status: SHIPPED | OUTPATIENT
Start: 2025-07-09

## 2025-07-09 RX ORDER — LORAZEPAM 1 MG/1
1 TABLET ORAL 2 TIMES DAILY
Qty: 90 TABLET | Refills: 0 | Status: SHIPPED | OUTPATIENT
Start: 2025-07-09 | End: 2025-07-09 | Stop reason: SDUPTHER

## 2025-07-24 ENCOUNTER — TELEPHONE (OUTPATIENT)
Age: 65
End: 2025-07-24

## 2025-07-24 ENCOUNTER — APPOINTMENT (OUTPATIENT)
Dept: LAB | Facility: CLINIC | Age: 65
End: 2025-07-24
Payer: COMMERCIAL

## 2025-07-24 DIAGNOSIS — Z13.0 SCREENING FOR DEFICIENCY ANEMIA: ICD-10-CM

## 2025-07-24 DIAGNOSIS — R53.83 OTHER FATIGUE: ICD-10-CM

## 2025-07-24 DIAGNOSIS — K64.9 HEMORRHOIDS, UNSPECIFIED HEMORRHOID TYPE: Primary | ICD-10-CM

## 2025-07-24 DIAGNOSIS — Z13.220 SCREENING FOR LIPID DISORDERS: ICD-10-CM

## 2025-07-24 DIAGNOSIS — R73.01 IMPAIRED FASTING GLUCOSE: ICD-10-CM

## 2025-07-24 LAB
ALBUMIN SERPL BCG-MCNC: 4.3 G/DL (ref 3.5–5)
ALP SERPL-CCNC: 53 U/L (ref 34–104)
ALT SERPL W P-5'-P-CCNC: 18 U/L (ref 7–52)
ANION GAP SERPL CALCULATED.3IONS-SCNC: 7 MMOL/L (ref 4–13)
AST SERPL W P-5'-P-CCNC: 28 U/L (ref 13–39)
BASOPHILS # BLD AUTO: 0.06 THOUSANDS/ÂΜL (ref 0–0.1)
BASOPHILS NFR BLD AUTO: 1 % (ref 0–1)
BILIRUB SERPL-MCNC: 0.68 MG/DL (ref 0.2–1)
BUN SERPL-MCNC: 14 MG/DL (ref 5–25)
CALCIUM SERPL-MCNC: 9.8 MG/DL (ref 8.4–10.2)
CHLORIDE SERPL-SCNC: 103 MMOL/L (ref 96–108)
CHOLEST SERPL-MCNC: 276 MG/DL (ref ?–200)
CO2 SERPL-SCNC: 29 MMOL/L (ref 21–32)
CREAT SERPL-MCNC: 0.7 MG/DL (ref 0.6–1.3)
EOSINOPHIL # BLD AUTO: 0.12 THOUSAND/ÂΜL (ref 0–0.61)
EOSINOPHIL NFR BLD AUTO: 2 % (ref 0–6)
ERYTHROCYTE [DISTWIDTH] IN BLOOD BY AUTOMATED COUNT: 12.9 % (ref 11.6–15.1)
EST. AVERAGE GLUCOSE BLD GHB EST-MCNC: 108 MG/DL
GFR SERPL CREATININE-BSD FRML MDRD: 90 ML/MIN/1.73SQ M
GLUCOSE P FAST SERPL-MCNC: 94 MG/DL (ref 65–99)
HBA1C MFR BLD: 5.4 %
HCT VFR BLD AUTO: 39.1 % (ref 34.8–46.1)
HDLC SERPL-MCNC: 84 MG/DL
HGB BLD-MCNC: 12.9 G/DL (ref 11.5–15.4)
IMM GRANULOCYTES # BLD AUTO: 0.01 THOUSAND/UL (ref 0–0.2)
IMM GRANULOCYTES NFR BLD AUTO: 0 % (ref 0–2)
LDLC SERPL CALC-MCNC: 174 MG/DL (ref 0–100)
LYMPHOCYTES # BLD AUTO: 1.94 THOUSANDS/ÂΜL (ref 0.6–4.47)
LYMPHOCYTES NFR BLD AUTO: 32 % (ref 14–44)
MCH RBC QN AUTO: 32.5 PG (ref 26.8–34.3)
MCHC RBC AUTO-ENTMCNC: 33 G/DL (ref 31.4–37.4)
MCV RBC AUTO: 99 FL (ref 82–98)
MONOCYTES # BLD AUTO: 0.43 THOUSAND/ÂΜL (ref 0.17–1.22)
MONOCYTES NFR BLD AUTO: 7 % (ref 4–12)
NEUTROPHILS # BLD AUTO: 3.55 THOUSANDS/ÂΜL (ref 1.85–7.62)
NEUTS SEG NFR BLD AUTO: 58 % (ref 43–75)
NONHDLC SERPL-MCNC: 192 MG/DL
NRBC BLD AUTO-RTO: 0 /100 WBCS
PLATELET # BLD AUTO: 267 THOUSANDS/UL (ref 149–390)
PMV BLD AUTO: 10.1 FL (ref 8.9–12.7)
POTASSIUM SERPL-SCNC: 4.4 MMOL/L (ref 3.5–5.3)
PROT SERPL-MCNC: 7.2 G/DL (ref 6.4–8.4)
RBC # BLD AUTO: 3.97 MILLION/UL (ref 3.81–5.12)
SODIUM SERPL-SCNC: 139 MMOL/L (ref 135–147)
TRIGL SERPL-MCNC: 89 MG/DL (ref ?–150)
TSH SERPL DL<=0.05 MIU/L-ACNC: 1.09 UIU/ML (ref 0.45–4.5)
WBC # BLD AUTO: 6.11 THOUSAND/UL (ref 4.31–10.16)

## 2025-07-24 PROCEDURE — 84443 ASSAY THYROID STIM HORMONE: CPT

## 2025-07-24 PROCEDURE — 80053 COMPREHEN METABOLIC PANEL: CPT

## 2025-07-24 PROCEDURE — 80061 LIPID PANEL: CPT

## 2025-07-24 PROCEDURE — 36415 COLL VENOUS BLD VENIPUNCTURE: CPT

## 2025-07-24 PROCEDURE — 83036 HEMOGLOBIN GLYCOSYLATED A1C: CPT

## 2025-07-24 PROCEDURE — 85025 COMPLETE CBC W/AUTO DIFF WBC: CPT

## 2025-07-24 RX ORDER — BENZOCAINE/MENTHOL 6 MG-10 MG
LOZENGE MUCOUS MEMBRANE 2 TIMES DAILY
Qty: 56 G | Refills: 0 | Status: SHIPPED | OUTPATIENT
Start: 2025-07-24

## 2025-07-28 ENCOUNTER — RESULTS FOLLOW-UP (OUTPATIENT)
Age: 65
End: 2025-07-28